# Patient Record
Sex: FEMALE | URBAN - METROPOLITAN AREA
[De-identification: names, ages, dates, MRNs, and addresses within clinical notes are randomized per-mention and may not be internally consistent; named-entity substitution may affect disease eponyms.]

---

## 2023-07-13 ENCOUNTER — LAB (OUTPATIENT)
Dept: LAB | Facility: CLINIC | Age: 33
End: 2023-07-13
Attending: NURSE PRACTITIONER
Payer: COMMERCIAL

## 2023-07-13 ENCOUNTER — OFFICE VISIT (OUTPATIENT)
Dept: OBGYN | Facility: CLINIC | Age: 33
End: 2023-07-13
Attending: NURSE PRACTITIONER
Payer: COMMERCIAL

## 2023-07-13 VITALS
BODY MASS INDEX: 22.51 KG/M2 | HEART RATE: 81 BPM | HEIGHT: 69 IN | DIASTOLIC BLOOD PRESSURE: 76 MMHG | WEIGHT: 152 LBS | SYSTOLIC BLOOD PRESSURE: 116 MMHG

## 2023-07-13 DIAGNOSIS — Z13.220 SCREENING FOR LIPID DISORDERS: ICD-10-CM

## 2023-07-13 DIAGNOSIS — B37.31 RECURRENT CANDIDIASIS OF VAGINA: ICD-10-CM

## 2023-07-13 DIAGNOSIS — Z30.432 ENCOUNTER FOR REMOVAL OF INTRAUTERINE CONTRACEPTIVE DEVICE: ICD-10-CM

## 2023-07-13 DIAGNOSIS — Z00.00 VISIT FOR PREVENTIVE HEALTH EXAMINATION: ICD-10-CM

## 2023-07-13 DIAGNOSIS — Z00.00 VISIT FOR PREVENTIVE HEALTH EXAMINATION: Primary | ICD-10-CM

## 2023-07-13 DIAGNOSIS — B00.9 HSV (HERPES SIMPLEX VIRUS) INFECTION: ICD-10-CM

## 2023-07-13 DIAGNOSIS — Z86.2 HISTORY OF ANEMIA: ICD-10-CM

## 2023-07-13 LAB
CHOLEST SERPL-MCNC: 151 MG/DL
CREAT SERPL-MCNC: 0.7 MG/DL (ref 0.51–0.95)
ERYTHROCYTE [DISTWIDTH] IN BLOOD BY AUTOMATED COUNT: 12.3 % (ref 10–15)
GFR SERPL CREATININE-BSD FRML MDRD: >90 ML/MIN/1.73M2
HCT VFR BLD AUTO: 42 % (ref 35–47)
HDLC SERPL-MCNC: 62 MG/DL
HGB BLD-MCNC: 14.7 G/DL (ref 11.7–15.7)
LDLC SERPL CALC-MCNC: 76 MG/DL
MCH RBC QN AUTO: 29.9 PG (ref 26.5–33)
MCHC RBC AUTO-ENTMCNC: 35 G/DL (ref 31.5–36.5)
MCV RBC AUTO: 86 FL (ref 78–100)
NONHDLC SERPL-MCNC: 89 MG/DL
PLATELET # BLD AUTO: 258 10E3/UL (ref 150–450)
RBC # BLD AUTO: 4.91 10E6/UL (ref 3.8–5.2)
TRIGL SERPL-MCNC: 67 MG/DL
WBC # BLD AUTO: 9.4 10E3/UL (ref 4–11)

## 2023-07-13 PROCEDURE — 82565 ASSAY OF CREATININE: CPT

## 2023-07-13 PROCEDURE — 99213 OFFICE O/P EST LOW 20 MIN: CPT | Performed by: NURSE PRACTITIONER

## 2023-07-13 PROCEDURE — 36415 COLL VENOUS BLD VENIPUNCTURE: CPT

## 2023-07-13 PROCEDURE — 58301 REMOVE INTRAUTERINE DEVICE: CPT | Performed by: NURSE PRACTITIONER

## 2023-07-13 PROCEDURE — 99385 PREV VISIT NEW AGE 18-39: CPT | Mod: 25 | Performed by: NURSE PRACTITIONER

## 2023-07-13 PROCEDURE — 85027 COMPLETE CBC AUTOMATED: CPT

## 2023-07-13 PROCEDURE — 80061 LIPID PANEL: CPT

## 2023-07-13 RX ORDER — PRENATAL VIT/IRON FUM/FOLIC AC 27MG-0.8MG
1 TABLET ORAL DAILY
COMMUNITY

## 2023-07-13 RX ORDER — ACYCLOVIR 400 MG/1
1 TABLET ORAL 2 TIMES DAILY
COMMUNITY
Start: 2023-01-10 | End: 2023-07-13

## 2023-07-13 RX ORDER — ACYCLOVIR 400 MG/1
400 TABLET ORAL 2 TIMES DAILY
Qty: 90 TABLET | Refills: 6 | Status: SHIPPED | OUTPATIENT
Start: 2023-07-13

## 2023-07-13 RX ORDER — LACTOBACILLUS RHAMNOSUS GG 10B CELL
1 CAPSULE ORAL 2 TIMES DAILY
COMMUNITY

## 2023-07-13 ASSESSMENT — ANXIETY QUESTIONNAIRES
5. BEING SO RESTLESS THAT IT IS HARD TO SIT STILL: NOT AT ALL
6. BECOMING EASILY ANNOYED OR IRRITABLE: NOT AT ALL
3. WORRYING TOO MUCH ABOUT DIFFERENT THINGS: NOT AT ALL
1. FEELING NERVOUS, ANXIOUS, OR ON EDGE: NOT AT ALL
IF YOU CHECKED OFF ANY PROBLEMS ON THIS QUESTIONNAIRE, HOW DIFFICULT HAVE THESE PROBLEMS MADE IT FOR YOU TO DO YOUR WORK, TAKE CARE OF THINGS AT HOME, OR GET ALONG WITH OTHER PEOPLE: NOT DIFFICULT AT ALL
GAD7 TOTAL SCORE: 2
2. NOT BEING ABLE TO STOP OR CONTROL WORRYING: NOT AT ALL
GAD7 TOTAL SCORE: 2
7. FEELING AFRAID AS IF SOMETHING AWFUL MIGHT HAPPEN: NOT AT ALL

## 2023-07-13 ASSESSMENT — PATIENT HEALTH QUESTIONNAIRE - PHQ9
5. POOR APPETITE OR OVEREATING: MORE THAN HALF THE DAYS
SUM OF ALL RESPONSES TO PHQ QUESTIONS 1-9: 2

## 2023-07-13 ASSESSMENT — PAIN SCALES - GENERAL: PAINLEVEL: NO PAIN (0)

## 2023-07-13 NOTE — NURSING NOTE
Chief Complaint   Patient presents with     Establish Care     Annual , pre-pregnancy consult   Joycelyn Jj LPN

## 2023-07-13 NOTE — PROGRESS NOTES
"  Progress Note    SUBJECTIVE:  River Dillon is an 32 year old, , who requests an Annual Preventive Exam.     Concerns today include:     1. IUD removal: Paragard IUD in place, placed 8 weeks postpartum.  Plans to use condoms until ready to conceive with 2nd child. Wondering if the IUD is contributing to frequent yeast infections.    2. Recurrent yeast infections: has had nearly monthly yeast infections since her son was born 2021.  Doing boric monthly which seems to help.  When she has taken Fluconazole, she would have a herpes outbreak. She feels the HSV is related to the yeast. Had intermittent yeast infections, about once every 5 yrs, since high school.  Has also tried Monistat OTC.  Has never had a yeast culture. No symptoms today.      3. HSV: first outbreak was in  when living in [facility].  Was not taking any suppression medication until this year. In the interim had 1-2 outbreaks, which resolved within 1 week. Taking Acyclovir 400mg daily.  Had SE with valcyclovir.      4. Hx of traumatic birth experience:   - Alvin, born 2023 Vaginal birth, 40w3d - gave birth in triage due to no rooms available.  2nd degree laceration \"both ways\". Uterine or vaginal prolapse. Had a hard time walking normal until October. Tried to get in with Pelvic floor PT, but was only able to do a virtual appointment. Still has occasional stress incontinence - sneezing, exercise.  Then moved to the [facility].  Not interested in restarting PT at this time. Feels she is able to control her pain well when she feels she is in control and delivering in triage felt like she was not able to do this. Desiring a 2nd pregnancy.     Periods are regular; have always been heavy.  No significant dysmenorrhea.     Last pap smear: 2020 NIL, HPV negative  - hx of abnormal pap smear in  - pos with HPV    Lipid profile: never drawn    Social hx: Pursuing a doctorate in [employer] History. Will finish this . . " Has 2 yr old son    Reports immunizations are up to date.     Mental health: no concerns today     Exercise: active lifestyle; has a 2 yr old  Diet: no red meat; tends to eat healthily, tries to limit sugar    Menstrual History:      2023     8:30 AM   Menstrual History   LAST MENSTRUAL PERIOD 6/15/2023     Mammogram current: n/a    Last Colonoscopy: n/a    HISTORY:  lactobacillus rhamnosus, GG, (CULTURELL) capsule, Take 1 capsule by mouth 2 times daily  PARAGARD INTRAUTERINE COPPER IU,   Prenatal Vit-Fe Fumarate-FA (PRENATAL MULTIVITAMIN W/IRON) 27-0.8 MG tablet, Take 1 tablet by mouth daily  Probiotic Product (PROBIOTIC ADVANCED PO),     No current facility-administered medications on file prior to visit.    No Known Allergies    There is no immunization history on file for this patient.    OB History    Para Term  AB Living   1 1 1 0 0 1   SAB IAB Ectopic Multiple Live Births   0 0 0 0 1     Past Medical History:   Diagnosis Date     Known health problems: none      Past Surgical History:   Procedure Laterality Date     NO HISTORY OF SURGERY       Family History   Problem Relation Age of Onset     Hypertension Mother      Thyroid Disease Mother      Polycystic ovary syndrome Mother      Skin Cancer Father      Breast Cancer Maternal Grandmother      Breast Cancer Paternal Grandmother      Melanoma Sister      Colon Cancer No family hx of      Social History     Socioeconomic History     Marital status:      Spouse name: None     Number of children: None     Years of education: None     Highest education level: None   Tobacco Use     Smoking status: Never     Passive exposure: Never     Smokeless tobacco: Never   Vaping Use     Vaping Use: Never used   Substance and Sexual Activity     Alcohol use: Yes     Comment: less than once per week     Drug use: Never     Sexual activity: Yes     Partners: Male     Birth control/protection: I.U.D.       ROS  ROS: 10 point ROS neg other than the  "symptoms noted above in the HPI.        7/13/2023     8:30 AM   PHQ-9 SCORE   PHQ-9 Total Score 2         7/13/2023     8:30 AM   KATHARINA-7 SCORE   Total Score 2     EXAM:  Blood pressure 116/76, pulse 81, height 1.746 m (5' 8.75\"), weight 68.9 kg (152 lb), last menstrual period 06/15/2023. Body mass index is 22.61 kg/m .  General - pleasant female in no acute distress.  Skin - no suspicious lesions or rashes  EENT-  euthyroid with out palpable nodules  Neck - supple without lymphadenopathy.  Lungs - clear to auscultation bilaterally.  Heart - regular rate and rhythm without murmur.  Abdomen - soft, nontender, nondistended, no masses or organomegaly noted.  Musculoskeletal - no gross deformities.  Neurological - normal strength, sensation, and mental status.    Breast Exam:  Breast: Without visible skin changes. No dimpling or lesions seen.   Breasts supple, non-tender with palpation, no dominant mass, nodularity, or nipple discharge noted bilaterally. Axillary nodes negative.      Pelvic Exam:  EG/BUS: Normal genital architecture without lesions, erythema or abnormal secretions; Bartholin's, Urethra, [place]'s normal   Urethral meatus: normal   Urethra: no masses, tenderness, or scarring  Vagina: moist, pink, rugae with creamy, white and odorless secretions  Cervix: pink, moist, closed, without lesion or CMT and IUD strings extend 0.5 cm from external os.  Rectum: anus normal     ASSESSMENT & PLAN:     1. Encounter for removal of intrauterine contraceptive device  IUD removed successfully today without complication - see procedure note below.   Patient will continue prenatal vitamin and use condoms until desiring to conceive.     2. Visit for preventive health examination  - Lipid Profile; Future  - CBC with Platelets; Future  - Pap smear due 2025    3. Screening for lipid disorders  - Lipid Profile; Future    4. History of anemia  - CBC with Platelets; Future    6. HSV (herpes simplex virus) infection  - Suppression " therapy: acyclovir (ZOVIRAX) 400 MG tablet; Take 1 tablet (400 mg) by mouth 2 times daily  Dispense: 90 tablet; Refill: 6  - Creatinine; Future    7. Recurrent candidiasis of vagina  - No current symptoms. IUD removed today in hopes that this will decrease rate of infections. Pt to return to clinic if symptoms return for yeast culture.      8. Birth trauma  - Offered counseling related to traumatic birth / postpartum experience. Pt declined today. Counseled on the supportive practices of this provider group related to physiologic birth.       Additional teaching done at this visit regarding calcium (1200 mg per day), self breast awareness, exercise, preconception, mental health and weight/diet.    Return to clinic in one year.  Follow-up as needed.    IUD Removal:  SUBJECTIVE:    Is a pregnancy test required: No.  Was a consent obtained?  Yes    PROCEDURE:    A speculum exam was performed and the cervix was visualized. The IUD string was visualized. Using ring forceps, the string  was grasped and the IUD removed intact.    POST PROCEDURE:    The patient tolerated the procedure well. Patient was discharged in stable condition.    BRYON Hatfield CNP

## 2023-07-13 NOTE — LETTER
"2023       RE: River Dillon  [address] [place] [address]     Dear Colleague,    Thank you for referring your patient, River Dillon, to the [facility] [place] [facility]. Please see a copy of my visit note below.      Progress Note    SUBJECTIVE:  River Dillon is an 32 year old, , who requests an Annual Preventive Exam.     Concerns today include:     1. IUD removal: Paragard IUD in place, placed 8 weeks postpartum.  Plans to use condoms until ready to conceive with 2nd child. Wondering if the IUD is contributing to frequent yeast infections.    2. Recurrent yeast infections: has had nearly monthly yeast infections since her son was born 2021.  Doing boric monthly which seems to help.  When she has taken Fluconazole, she would have a herpes outbreak. She feels the HSV is related to the yeast. Had intermittent yeast infections, about once every 5 yrs, since high school.  Has also tried Monistat OTC.  Has never had a yeast culture. No symptoms today.      3. HSV: first outbreak was in  when living in [facility].  Was not taking any suppression medication until this year. In the interim had 1-2 outbreaks, which resolved within 1 week. Taking Acyclovir 400mg daily.  Had SE with valcyclovir.      4. Hx of traumatic birth experience:   - Alvin, born 2021 Vaginal birth, 40w3d - gave birth in triage due to no rooms available.  2nd degree laceration \"both ways\". Uterine or vaginal prolapse. Had a hard time walking normal until October. Tried to get in with Pelvic floor PT, but was only able to do a virtual appointment. Still has occasional stress incontinence - sneezing, exercise.  Then moved to the [facility].  Not interested in restarting PT at this time. Feels she is able to control her pain well when she feels she is in control and delivering in triage felt like she was not able to do " this. Desiring a 2nd pregnancy.     Periods are regular; have always been heavy.  No significant dysmenorrhea.     Last pap smear: 2020 NIL, HPV negative  - hx of abnormal pap smear in  - pos with HPV    Lipid profile: never drawn    Social hx: Pursuing a doctorate in [employer] History. Will finish this . . Has 2 yr old son    Reports immunizations are up to date.     Mental health: no concerns today     Exercise: active lifestyle; has a 2 yr old  Diet: no red meat; tends to eat healthily, tries to limit sugar    Menstrual History:      2023     8:30 AM   Menstrual History   LAST MENSTRUAL PERIOD 6/15/2023     Mammogram current: n/a    Last Colonoscopy: n/a    HISTORY:  lactobacillus rhamnosus, GG, (CULTURELL) capsule, Take 1 capsule by mouth 2 times daily  PARAGARD INTRAUTERINE COPPER IU,   Prenatal Vit-Fe Fumarate-FA (PRENATAL MULTIVITAMIN W/IRON) 27-0.8 MG tablet, Take 1 tablet by mouth daily  Probiotic Product (PROBIOTIC ADVANCED PO),     No current facility-administered medications on file prior to visit.    No Known Allergies    There is no immunization history on file for this patient.    OB History    Para Term  AB Living   1 1 1 0 0 1   SAB IAB Ectopic Multiple Live Births   0 0 0 0 1     Past Medical History:   Diagnosis Date    Known health problems: none      Past Surgical History:   Procedure Laterality Date    NO HISTORY OF SURGERY       Family History   Problem Relation Age of Onset    Hypertension Mother     Thyroid Disease Mother     Polycystic ovary syndrome Mother     Skin Cancer Father     Breast Cancer Maternal Grandmother     Breast Cancer Paternal Grandmother     Melanoma Sister     Colon Cancer No family hx of      Social History     Socioeconomic History    Marital status:      Spouse name: None    Number of children: None    Years of education: None    Highest education level: None   Tobacco Use    Smoking status: Never     Passive exposure: Never     "Smokeless tobacco: Never   Vaping Use    Vaping Use: Never used   Substance and Sexual Activity    Alcohol use: Yes     Comment: less than once per week    Drug use: Never    Sexual activity: Yes     Partners: Male     Birth control/protection: I.U.D.       ROS  ROS: 10 point ROS neg other than the symptoms noted above in the HPI.        7/13/2023     8:30 AM   PHQ-9 SCORE   PHQ-9 Total Score 2         7/13/2023     8:30 AM   KATHARINA-7 SCORE   Total Score 2     EXAM:  Blood pressure 116/76, pulse 81, height 1.746 m (5' 8.75\"), weight 68.9 kg (152 lb), last menstrual period 06/15/2023. Body mass index is 22.61 kg/m .  General - pleasant female in no acute distress.  Skin - no suspicious lesions or rashes  EENT-  euthyroid with out palpable nodules  Neck - supple without lymphadenopathy.  Lungs - clear to auscultation bilaterally.  Heart - regular rate and rhythm without murmur.  Abdomen - soft, nontender, nondistended, no masses or organomegaly noted.  Musculoskeletal - no gross deformities.  Neurological - normal strength, sensation, and mental status.    Breast Exam:  Breast: Without visible skin changes. No dimpling or lesions seen.   Breasts supple, non-tender with palpation, no dominant mass, nodularity, or nipple discharge noted bilaterally. Axillary nodes negative.      Pelvic Exam:  EG/BUS: Normal genital architecture without lesions, erythema or abnormal secretions; Bartholin's, Urethra, [place]'s normal   Urethral meatus: normal   Urethra: no masses, tenderness, or scarring  Vagina: moist, pink, rugae with creamy, white and odorless secretions  Cervix: pink, moist, closed, without lesion or CMT and IUD strings extend 0.5 cm from external os.  Rectum: anus normal     ASSESSMENT & PLAN:     1. Encounter for removal of intrauterine contraceptive device  IUD removed successfully today without complication - see procedure note below.   Patient will continue prenatal vitamin and use condoms until desiring to conceive. "     2. Visit for preventive health examination  - Lipid Profile; Future  - CBC with Platelets; Future  - Pap smear due 2025    3. Screening for lipid disorders  - Lipid Profile; Future    4. History of anemia  - CBC with Platelets; Future    6. HSV (herpes simplex virus) infection  - Suppression therapy: acyclovir (ZOVIRAX) 400 MG tablet; Take 1 tablet (400 mg) by mouth 2 times daily  Dispense: 90 tablet; Refill: 6  - Creatinine; Future    7. Recurrent candidiasis of vagina  - No current symptoms. IUD removed today in hopes that this will decrease rate of infections. Pt to return to clinic if symptoms return for yeast culture.      8. Birth trauma  - Offered counseling related to traumatic birth / postpartum experience. Pt declined today. Counseled on the supportive practices of this provider group related to physiologic birth.       Additional teaching done at this visit regarding calcium (1200 mg per day), self breast awareness, exercise, preconception, mental health and weight/diet.    Return to clinic in one year.  Follow-up as needed.    IUD Removal:  SUBJECTIVE:    Is a pregnancy test required: No.  Was a consent obtained?  Yes    PROCEDURE:    A speculum exam was performed and the cervix was visualized. The IUD string was visualized. Using ring forceps, the string  was grasped and the IUD removed intact.    POST PROCEDURE:    The patient tolerated the procedure well. Patient was discharged in stable condition.    BRYON Hatfield CNP

## 2023-07-13 NOTE — PATIENT INSTRUCTIONS
Thank you for trusting us with your care!     If you need to contact us for questions about:  Symptoms, Scheduling & Medical Questions; Non-urgent (2-3 day response) Eleanor message, Urgent (needing response today) [phone] (if after 3:30pm next day response)   Prescriptions: Please call your Pharmacy   Billing: [facility] [phone] or [facility]:[phone]

## 2023-08-30 PROBLEM — A60.00 GENITAL HERPES SIMPLEX: Status: ACTIVE | Noted: 2021-05-27

## 2023-08-30 PROBLEM — T83.32XA IUD THREADS LOST: Status: ACTIVE | Noted: 2023-02-02

## 2023-08-30 PROBLEM — N39.3 FEMALE STRESS INCONTINENCE: Status: ACTIVE | Noted: 2022-10-04

## 2023-08-30 PROBLEM — N89.8 VAGINAL IRRITATION: Status: ACTIVE | Noted: 2023-08-30

## 2023-08-30 PROBLEM — M62.89 PELVIC FLOOR DYSFUNCTION: Status: ACTIVE | Noted: 2022-10-04

## 2023-08-30 NOTE — PROGRESS NOTES
SUBJECTIVE:  River Dillon is an 32 year old  Female, , who presents with complaints of vaginal itching and chronic vaginal infections     She is a former patient to the [facility].   LMP 23, 4 days, cycles are ~28-30 days  Currently sexually active with one partner. They are monogamous, declines STD screening today (has recently had a negative GC/CT screen)    Not currently using any method of birth control, River would like to conceive their second child, but would like to get her recurrent vaginal infection treated and under control first    River treated a yeast infection with monistat for one year. She got into a cycle of having a yeast infection, herpes, menstrual period cycle last summer  Feels like she has had on going infections on and off since giving birth two years ago.  River started Acyclovir 400mg twice a day for suppression and this has been effective in preventing HSV outbreaks    Developed vaginal itching and vulvar irritation last week, today the symptoms are a 7/10    Gets tingling and low back pain before she has these symptoms. She denies any herpes outbreaks with the symptoms but has noticed some separation of her skin, cracking.     Takes boric acid one suppository for 3-4 days. Tends to get itchy 3-4 days before her cycle. This has been the most effective in managing her symptoms. Monistat has not been as effective and she feels like Diflucan causes her to have a herpes outbreak    Reports she uses cotton only underwear, avoids wearing underwear at night and avoids fragrances to any products she is using.   River does have documented positive candida species yeast infections on:    23 Candida species  22: Candida species    Recent annual exam with Batsheva Zulauga, NP: 23: IUD removed    Last pap 2020 HPV neg  2023 Lipid screen WNL  23 negative vaginitis panel  23: Candida yeast infection  2023 HgbA1C  4.9      Menstrual History:      7/13/2023     8:30 AM 8/31/2023     8:40 AM   Menstrual History   LAST MENSTRUAL PERIOD 6/15/2023 7/18/2023       Past Medical History:   Diagnosis Date    Known health problems: none         OBJECTIVE:   /74   Pulse 75   Wt 67.6 kg (149 lb)   LMP 07/18/2023   BMI 22.16 kg/m       She appears well, afebrile.  Abdomen: benign, soft, nontender, no masses.  CVA tenderness to percussion.    Pelvic Exam:  EG/BUS: Vulva are erythematous and edematous, cracking of skin, causing separation and ulceration on the outer aspect of her left labia majora.     Vagina: moist, pink, rugae with creamy, white, and odorlesssecretions  Cervix: Multiparous, smooth pink  Uterus:anteverted,    Adnexa:Within normal limits and No masses, nodularity, tenderness  Rectum:anus normal    Collected: wet prep both from external genitalia and inside vagina, vaginitis panel, yeast panel and HSV PCR    POCT Wet prep: Ph: 4.0  External genitalia: + yeast, - BV, -trich (more yeast with vulvar sampling  Vaginal sampling: + yeast, - BV, - Tich        ASSESSMENT:   Encounter Diagnoses   Name Primary?    Vaginal irritation Yes    Yeast infection of the vagina       Positive yeast    PLAN:   Orders Placed This Encounter   Procedures    Wet Prep POCT     Orders Placed This Encounter   Medications    terconazole (TERAZOL 7) 0.4 % vaginal cream     Sig: Place 1 applicator vaginally At Bedtime     Dispense:  45 g     Refill:  1    fluconazole (DIFLUCAN) 150 MG tablet     Sig: Take 1 tablet (150 mg) by mouth daily for 33 doses     Dispense:  33 tablet     Refill:  0     Take one tablet daily for three days, then weekly for 6 months      -Reviewed recommendation that River take Diflucan for three days then weekly for 6 months to treat recurrent vaginal yeast. River would like to try Terconazole right now and consider if she would like to try Diflucan as well.   -Reassured that the Acyclovir 800mg daily should suppress a  herpes outbreak  -Reviewed recommendation not to take Diflucan during pregnancy, could discontinue when she starts trying or avoid taking it from between ovulation until she gets her period. (Or at least from implantation until her menstrual cycle.   -Follow up lab results.     -Reviewed vulvar hygiene recommendations: Wear breathable cotton underwear, bathe and change into new clothes after working out. Avoid fragrant body washes, laundry detergent, body products and bubble bath. Avoid douching or inserting products vaginally.   -Try to increase the healthy bacteria by taking probiotics. Some options are Femdophilis or Florajen from a co-op. Probiotics with bifudus, acidophilis and other healthy bacteria (some studies recommend L rhamnosus GR-1, L reuteri, in addition to L. Acidophilis).       Return to clinic prn if symptoms persist or worsen.    Time spent:  Chart review/Pre-chartin min on day of service  Face-to-face visit: 40   Documentation: 10  Total time spent on the day of service: 55 minutes    BRYON Hernández, LUIS MANUEL

## 2023-08-31 ENCOUNTER — OFFICE VISIT (OUTPATIENT)
Dept: OBGYN | Facility: CLINIC | Age: 33
End: 2023-08-31
Attending: ADVANCED PRACTICE MIDWIFE
Payer: COMMERCIAL

## 2023-08-31 VITALS
SYSTOLIC BLOOD PRESSURE: 113 MMHG | WEIGHT: 149 LBS | BODY MASS INDEX: 22.16 KG/M2 | DIASTOLIC BLOOD PRESSURE: 74 MMHG | HEART RATE: 75 BPM

## 2023-08-31 DIAGNOSIS — N89.8 VAGINAL IRRITATION: ICD-10-CM

## 2023-08-31 DIAGNOSIS — B37.31 YEAST INFECTION OF THE VAGINA: Primary | ICD-10-CM

## 2023-08-31 LAB
BACTERIAL VAGINOSIS VAG-IMP: NEGATIVE
CANDIDA DNA VAG QL NAA+PROBE: DETECTED
CANDIDA GLABRATA / CANDIDA KRUSEI DNA: NOT DETECTED
CLUE CELLS: NEGATIVE
T VAGINALIS DNA VAG QL NAA+PROBE: NOT DETECTED
TRICHOMONAS (WET PREP): NEGATIVE
WBC (WET PREP): ABNORMAL
YEAST (WET PREP): POSITIVE

## 2023-08-31 PROCEDURE — 87106 FUNGI IDENTIFICATION YEAST: CPT | Performed by: ADVANCED PRACTICE MIDWIFE

## 2023-08-31 PROCEDURE — 99215 OFFICE O/P EST HI 40 MIN: CPT | Performed by: ADVANCED PRACTICE MIDWIFE

## 2023-08-31 PROCEDURE — 0352U MULTIPLEX VAGINAL PANEL BY PCR: CPT | Performed by: ADVANCED PRACTICE MIDWIFE

## 2023-08-31 PROCEDURE — 99213 OFFICE O/P EST LOW 20 MIN: CPT | Performed by: ADVANCED PRACTICE MIDWIFE

## 2023-08-31 PROCEDURE — 87210 SMEAR WET MOUNT SALINE/INK: CPT | Performed by: ADVANCED PRACTICE MIDWIFE

## 2023-08-31 PROCEDURE — 87529 HSV DNA AMP PROBE: CPT | Mod: 59 | Performed by: ADVANCED PRACTICE MIDWIFE

## 2023-08-31 RX ORDER — FLUCONAZOLE 150 MG/1
150 TABLET ORAL DAILY
Qty: 33 TABLET | Refills: 0 | Status: SHIPPED | OUTPATIENT
Start: 2023-08-31 | End: 2023-10-03

## 2023-08-31 RX ORDER — TERCONAZOLE 0.4 %
1 CREAM WITH APPLICATOR VAGINAL AT BEDTIME
Qty: 45 G | Refills: 1 | Status: SHIPPED | OUTPATIENT
Start: 2023-08-31

## 2023-08-31 ASSESSMENT — PAIN SCALES - GENERAL: PAINLEVEL: NO PAIN (0)

## 2023-08-31 NOTE — PATIENT INSTRUCTIONS
Try to increase the healthy bacteria by taking probiotics. Some options are Femdophilis or Florajen from a co-op. Probiotics with bifudus, acidophilis and other healthy bacteria (some studies recommend L rhamnosus GR-1, L reuteri, in addition to L. Acidophilis).     Boric acid vaginal suppositories once a night for 7 nights can be used to treat a vaginal infection. Some people do one boric acid suppository after intercourse to prevent infections as well. You should be able to get those on-line, or you can get boric acid powder with size 00 gel caps and make your own. Make sure not to take any of this by mouth.

## 2023-08-31 NOTE — LETTER
2023       RE: River Dillon  [address] [place] [address]     Dear Colleague,    Thank you for referring your patient, River Dillon, to the [facility] [place] at [facility]. Please see a copy of my visit note below.    SUBJECTIVE:  River Dillon is an 32 year old  Female, , who presents with complaints of vaginal itching and chronic vaginal infections     She is a former patient to the [facility].   LMP 23, 4 days, cycles are ~28-30 days  Currently sexually active with one partner. They are monogamous, declines STD screening today (has recently had a negative GC/CT screen)    Not currently using any method of birth control, River would like to conceive their second child, but would like to get her recurrent vaginal infection treated and under control first    River treated a yeast infection with monistat for one year. She got into a cycle of having a yeast infection, herpes, menstrual period cycle last summer  Feels like she has had on going infections on and off since giving birth two years ago.  River started Acyclovir 400mg twice a day for suppression and this has been effective in preventing HSV outbreaks    Developed vaginal itching and vulvar irritation last week, today the symptoms are a 7/10    Gets tingling and low back pain before she has these symptoms. She denies any herpes outbreaks with the symptoms but has noticed some separation of her skin, cracking.     Takes boric acid one suppository for 3-4 days. Tends to get itchy 3-4 days before her cycle. This has been the most effective in managing her symptoms. Monistat has not been as effective and she feels like Diflucan causes her to have a herpes outbreak    Reports she uses cotton only underwear, avoids wearing underwear at night and avoids fragrances to any products she is using.   River does have  documented positive candida species yeast infections on:    2/2/23 Candida species  9/30/22: Candida species    Recent annual exam with Batsheva Zuluaga, NP: 7/13/23: IUD removed    Last pap 8/2020 HPV neg  7/2023 Lipid screen WNL  2/6/23 negative vaginitis panel  2/2/23: Candida yeast infection  2/2023 HgbA1C 4.9      Menstrual History:      7/13/2023     8:30 AM 8/31/2023     8:40 AM   Menstrual History   LAST MENSTRUAL PERIOD 6/15/2023 7/18/2023       Past Medical History:   Diagnosis Date    Known health problems: none         OBJECTIVE:   /74   Pulse 75   Wt 67.6 kg (149 lb)   LMP 07/18/2023   BMI 22.16 kg/m       She appears well, afebrile.  Abdomen: benign, soft, nontender, no masses.  CVA tenderness to percussion.    Pelvic Exam:  EG/BUS: Vulva are erythematous and edematous, cracking of skin, causing separation and ulceration on the outer aspect of her left labia majora.     Vagina: moist, pink, rugae with creamy, white, and odorlesssecretions  Cervix: Multiparous, smooth pink  Uterus:anteverted,    Adnexa:Within normal limits and No masses, nodularity, tenderness  Rectum:anus normal    Collected: wet prep both from external genitalia and inside vagina, vaginitis panel, yeast panel and HSV PCR    POCT Wet prep: Ph: 4.0  External genitalia: + yeast, - BV, -trich (more yeast with vulvar sampling  Vaginal sampling: + yeast, - BV, - Tich        ASSESSMENT:   Encounter Diagnoses   Name Primary?    Vaginal irritation Yes    Yeast infection of the vagina       Positive yeast    PLAN:   Orders Placed This Encounter   Procedures    Wet Prep POCT     Orders Placed This Encounter   Medications    terconazole (TERAZOL 7) 0.4 % vaginal cream     Sig: Place 1 applicator vaginally At Bedtime     Dispense:  45 g     Refill:  1    fluconazole (DIFLUCAN) 150 MG tablet     Sig: Take 1 tablet (150 mg) by mouth daily for 33 doses     Dispense:  33 tablet     Refill:  0     Take one tablet daily for three days, then  weekly for 6 months      -Reviewed recommendation that River take Diflucan for three days then weekly for 6 months to treat recurrent vaginal yeast. River would like to try Terconazole right now and consider if she would like to try Diflucan as well.   -Reassured that the Acyclovir 800mg daily should suppress a herpes outbreak  -Reviewed recommendation not to take Diflucan during pregnancy, could discontinue when she starts trying or avoid taking it from between ovulation until she gets her period. (Or at least from implantation until her menstrual cycle.   -Follow up lab results.     -Reviewed vulvar hygiene recommendations: Wear breathable cotton underwear, bathe and change into new clothes after working out. Avoid fragrant body washes, laundry detergent, body products and bubble bath. Avoid douching or inserting products vaginally.   -Try to increase the healthy bacteria by taking probiotics. Some options are Femdophilis or Florajen from a co-op. Probiotics with bifudus, acidophilis and other healthy bacteria (some studies recommend L rhamnosus GR-1, L reuteri, in addition to L. Acidophilis).     Return to clinic prn if symptoms persist or worsen.    Time spent:  Chart review/Pre-chartin min on day of service  Face-to-face visit: 40   Documentation: 10  Total time spent on the day of service: 55 minutes    BRYON Hernández, LUIS MANUEL

## 2023-09-01 LAB
HSV1 DNA SPEC QL NAA+PROBE: NOT DETECTED
HSV2 DNA SPEC QL NAA+PROBE: NOT DETECTED

## 2023-09-04 LAB — BACTERIA SPEC CULT: ABNORMAL

## 2023-10-09 ENCOUNTER — VIRTUAL VISIT (OUTPATIENT)
Dept: OBGYN | Facility: CLINIC | Age: 33
End: 2023-10-09
Attending: REGISTERED NURSE
Payer: COMMERCIAL

## 2023-10-09 DIAGNOSIS — Z34.90 SUPERVISION OF NORMAL PREGNANCY: Primary | ICD-10-CM

## 2023-10-09 PROBLEM — T83.32XA IUD THREADS LOST: Status: RESOLVED | Noted: 2023-02-02 | Resolved: 2023-10-09

## 2023-10-09 PROCEDURE — 99207 PR NO BILLABLE SERVICE THIS VISIT: CPT

## 2023-10-09 RX ORDER — OMEGA-3 FATTY ACIDS/FISH OIL 300-1000MG
1 CAPSULE ORAL DAILY
COMMUNITY

## 2023-10-09 NOTE — LETTER
10/9/2023       RE: River Dillon  [address] [place] [address]     Dear Colleague,    Thank you for referring your patient, River Dillon, to the [facility] [place] at [facility]. Please see a copy of my visit note below.    KATHERINE RN Prenatal Intake note  Subjective     32 year old female newly pregnant.  LMP: 23.    exact and regular cycles  Pregnancy is unplanned but welcomed.    Partner/support person name and relationship  Alex (legal name is Franklin).        Symptoms since LMP include fatigue and constantly queasy. Patient has tried these relief   measures: small frequent meals and increased rest.    OB HISTORY  OB History    Para Term  AB Living   2 1 1 0 0 1   SAB IAB Ectopic Multiple Live Births   0 0 0 0 1      # Outcome Date GA Lbr Jackson/2nd Weight Sex Delivery Anes PTL Lv   2 Current            1 Term 21 40w3d  3.969 kg (8 lb 12 oz) M Vag-Spont Local N SURAJ      Name: ALEKSANDR,MALE      Apgar1: 6  Apgar5: 9      Obstetric Comments   NONE-first child born during pandemic.  River reports this was traumatic experience for her            OB COMPLICATIONS  First Pregnancy No  GDM No  HTNNo  Preeclampsia No   labor No   birth No   birth No  PP hemorrhage No  Retained placenta No  PP mood disorder No  Fetal anomaly No  FGR No  Macrosomia  No  3rd or 4th degree laceration  No  Shoulder dystocia No  NICU admit No       PERSONAL/SOCIAL HISTORY  *updated all tabs in history    lives with their family.  Employment: Unemployed .  Her partner works as a public interest .   MENTAL HEALTH HISTORY:        - Current Medications    Current Outpatient Medications   Medication Sig Dispense Refill    omega 3 1000 MG CAPS Take 1 capful. by mouth daily      acyclovir (ZOVIRAX) 400 MG tablet Take 1 tablet (400 mg) by mouth 2 times daily 90 tablet 6    lactobacillus  rhamnosus, GG, (CULTURELL) capsule Take 1 capsule by mouth 2 times daily      Prenatal Vit-Fe Fumarate-FA (PRENATAL MULTIVITAMIN W/IRON) 27-0.8 MG tablet Take 1 tablet by mouth daily      terconazole (TERAZOL 7) 0.4 % vaginal cream Place 1 applicator vaginally At Bedtime 45 g 1           - Co-morbids    Past Medical History:   Diagnosis Date    Herpes simplex virus (HSV) infection 2012    Known health problems: none     Pelvic floor dysfunction     Uncomplicated asthma 2003    Urinary incontinence 2021       - Genetic/Infection questionnaire completed, risks include possible ASD in FOBs father. Discussed genetic screening options, patient does desire first trimester genetic screening  Pt  does not have a recent known exposure to Parvo or CMV so IgG/IgM testing WILL NOT be ordered.   Flu Vaccine.  Patient declined, will consider next visit  COVID Vaccine had initial doses.  She has not had the 2023 booster.  She thinks that she had covid very early in outbreak.  She was interviewing Chinese/americ[place] in [place] before it was identified  - Discussed expectations for routine prenatal care and scheduling.  -Discussed highlights from The Expectant Family booklet on warning signs, safe pregnancy and prevention of infections diseases, nutrition and exercise.  - Patient was encouraged to start prenatal vitamins as tolerated, if experiencing nausea and vomiting then OK to switch to folic acid only at this time.    -Additional items: None  -Reconciled and reviewed problem list  -Pt scheduled for dating US and NOB on 10/13/23    Charity Espinal RN

## 2023-10-09 NOTE — PROGRESS NOTES
KATHERINE RN Prenatal Intake note  Subjective     32 year old female newly pregnant.  LMP: 23.    exact and regular cycles  Pregnancy is unplanned but welcomed.    Partner/support person name and relationship  Alex (legal name is Franklin).        Symptoms since LMP include fatigue and constantly queasy. Patient has tried these relief   measures: small frequent meals and increased rest.    OB HISTORY  OB History    Para Term  AB Living   2 1 1 0 0 1   SAB IAB Ectopic Multiple Live Births   0 0 0 0 1      # Outcome Date GA Lbr Jackson/2nd Weight Sex Delivery Anes PTL Lv   2 Current            1 Term 21 40w3d  3.969 kg (8 lb 12 oz) M Vag-Spont Local N SURAJ      Name: ALEKSANDR,MALE      Apgar1: 6  Apgar5: 9      Obstetric Comments   NONE-first child born during pandemic.  River reports this was traumatic experience for her            OB COMPLICATIONS  First Pregnancy No  GDM No  HTNNo  Preeclampsia No   labor No   birth No   birth No  PP hemorrhage No  Retained placenta No  PP mood disorder No  Fetal anomaly No  FGR No  Macrosomia  No  3rd or 4th degree laceration  No  Shoulder dystocia No  NICU admit No       PERSONAL/SOCIAL HISTORY  *updated all tabs in history    lives with their family.  Employment: Unemployed .  Her partner works as a public interest .   MENTAL HEALTH HISTORY:        - Current Medications    Current Outpatient Medications   Medication Sig Dispense Refill    omega 3 1000 MG CAPS Take 1 capful. by mouth daily      acyclovir (ZOVIRAX) 400 MG tablet Take 1 tablet (400 mg) by mouth 2 times daily 90 tablet 6    lactobacillus rhamnosus, GG, (CULTURELL) capsule Take 1 capsule by mouth 2 times daily      Prenatal Vit-Fe Fumarate-FA (PRENATAL MULTIVITAMIN W/IRON) 27-0.8 MG tablet Take 1 tablet by mouth daily      terconazole (TERAZOL 7) 0.4 % vaginal cream Place 1 applicator vaginally At Bedtime 45 g 1           - Co-morbids    Past Medical  History:   Diagnosis Date    Herpes simplex virus (HSV) infection 2012    Known health problems: none     Pelvic floor dysfunction     Uncomplicated asthma 2003    Urinary incontinence 2021       - Genetic/Infection questionnaire completed, risks include possible ASD in FOBs father. Discussed genetic screening options, patient does desire first trimester genetic screening  Pt  does not have a recent known exposure to Parvo or CMV so IgG/IgM testing WILL NOT be ordered.   Flu Vaccine.  Patient declined, will consider next visit  COVID Vaccine had initial doses.  She has not had the 2023 booster.  She thinks that she had covid very early in outbreak.  She was interviewing Chinese/americ[place] before it was identified  - Discussed expectations for routine prenatal care and scheduling.  -Discussed highlights from The Expectant Family booklet on warning signs, safe pregnancy and prevention of infections diseases, nutrition and exercise.  - Patient was encouraged to start prenatal vitamins as tolerated, if experiencing nausea and vomiting then OK to switch to folic acid only at this time.    -Additional items: None  -Reconciled and reviewed problem list  -Pt scheduled for dating US and NOB on 10/13/23    Charity Espinal RN

## 2023-10-09 NOTE — PATIENT INSTRUCTIONS
Learning About Pregnancy  Your Care Instructions     Your health in the early weeks of your pregnancy is particularly important for your baby's health. Take good care of yourself. Anything you do that harms your body can also harm your baby.  Make sure to go to all of your doctor appointments. Regular checkups will help keep you and your baby healthy.  How can you care for yourself at home?  Diet    Eat a balanced diet. Make sure your diet includes plenty of beans, peas, and leafy green vegetables.     Do not skip meals or go for many hours without eating. If you are nauseated, try to eat a small, healthy snack every 2 to 3 hours.     Do not eat fish that has a high level of mercury, such as shark, swordfish, or mackerel. Do not eat more than one can of tuna each week.     Drink plenty of fluids. If you have kidney, heart, or liver disease and have to limit fluids, talk with your doctor before you increase the amount of fluids you drink.     Cut down on caffeine, such as coffee, tea, and cola.     Do not drink alcohol, such as beer, wine, or hard liquor.     Take a multivitamin that contains at least 400 micrograms (mcg) of folic acid to help prevent birth defects. Fortified cereal and whole wheat bread are good additional sources of folic acid.     Increase the calcium in your diet. Try to drink a quart of skim milk each day. You may also take calcium supplements and choose foods such as cheese and yogurt.   Lifestyle    Make sure you go to your follow-up appointments.     Get plenty of rest. You may be unusually tired while you are pregnant.     Get at least 30 minutes of exercise on most days of the week. Walking is a good choice. If you have not exercised in the past, start out slowly. Take several short walks each day.     Do not smoke. If you need help quitting, talk to your doctor about stop-smoking programs. These can increase your chances of quitting for good.     Do not touch cat feces or litter boxes.  Also, wash your hands after you handle raw meat, and fully cook all meat before you eat it. Wear gloves when you work in the yard or garden, and wash your hands well when you are done. Cat feces, raw or undercooked meat, and contaminated dirt can cause an infection that may harm your baby or lead to a miscarriage.     Avoid things that can make your body too hot and may be harmful to your baby, such as a hot tub or sauna. Or talk with your doctor before doing anything that raises your body temperature. Your doctor can tell you if it's safe.     Avoid chemical fumes, paint fumes, or poisons.     Do not use illegal drugs, marijuana, or alcohol.   Medicines    Review all of your medicines with your doctor. Some of your routine medicines may need to be changed to protect your baby.     Use acetaminophen (Tylenol) to relieve minor problems, such as a mild headache or backache or a mild fever with cold symptoms. Do not use nonsteroidal anti-inflammatory drugs (NSAIDs), such as ibuprofen (Advil, Motrin) or naproxen (Aleve), unless your doctor says it is okay.     Do not take two or more pain medicines at the same time unless the doctor told you to. Many pain medicines have acetaminophen, which is Tylenol. Too much acetaminophen (Tylenol) can be harmful.     Take your medicines exactly as prescribed. Call your doctor if you think you are having a problem with your medicine.   To manage morning sickness    If you feel sick when you first wake up, try eating a small snack (such as crackers) before you get out of bed. Allow some time to digest the snack, and then get out of bed slowly.     Do not skip meals or go for long periods without eating. An empty stomach can make nausea worse.     Eat small, frequent meals instead of three large meals each day.     Drink plenty of fluids.     Eat foods that are high in protein but low in fat.     If you are taking iron supplements, ask your doctor if they are necessary. Iron can make  "nausea worse.     Avoid any smells, such as coffee, that make you feel sick.     Get lots of rest. Morning sickness may be worse when you are tired.   Follow-up care is a key part of your treatment and safety. Be sure to make and go to all appointments, and call your doctor if you are having problems. It's also a good idea to know your test results and keep a list of the medicines you take.  Where can you learn more?  Go to https://www.[email or web address].net/patiented  Enter E868 in the search box to learn more about \"Learning About Pregnancy.\"  Current as of: November 9, 2022               Content Version: 13.7    5469-8640 [employer].   Care instructions adapted under license by your healthcare professional. If you have questions about a medical condition or this instruction, always ask your healthcare professional. [employer] disclaims any warranty or liability for your use of this information.      Weeks 6 to 10 of Your Pregnancy: Care Instructions  During these weeks of pregnancy, your body goes through many changes. You may start to feel different, both in your body and your emotions. Each pregnancy is different, so there's no \"right\" way to feel. These early weeks are a time to make healthy choices for you and your pregnancy.    Take a daily prenatal vitamin. Choose one with folic acid in it.   Avoid alcohol, tobacco, and drugs (including marijuana). If you need help quitting, talk to your doctor.     Drink plenty of liquids.  Be sure to drink enough water. And limit sodas, other sweetened drinks, and caffeine.     Choose foods that are good sources of calcium, iron, and folate.  You can try dairy products, dark leafy greens, fortified orange juice and cereals, almonds, broccoli, dried fruit, and beans.     Avoid foods that may be harmful.  Don't eat raw meat, deli meat, raw seafood, or raw eggs. Avoid soft cheese and unpasteurized dairy, like Brie and blue cheese. And don't eat fish that " "contains a lot of mercury, like shark and swordfish.     Don't touch galen litter or cat poop.  They can cause an infection that could be harmful during pregnancy.     Avoid things that can make your body too hot.  For example, avoid hot tubs and saunas.     Soothe morning sickness.  Try eating 5 or 6 small meals a day, getting some fresh air, or using josey to control symptoms.     Ask your doctor about flu and COVID-19 shots.  Getting them can help protect against infection.   Follow-up care is a key part of your treatment and safety. Be sure to make and go to all appointments, and call your doctor if you are having problems. It's also a good idea to know your test results and keep a list of the medicines you take.  Where can you learn more?  Go to [email or web address]://[email or web address]/patiented  Enter G112 in the search box to learn more about \"Weeks 6 to 10 of Your Pregnancy: Care Instructions.\"  Current as of: November 9, 2022               Content Version: 13.7 2006-2023 [employer].   Care instructions adapted under license by your healthcare professional. If you have questions about a medical condition or this instruction, always ask your healthcare professional. [employer] disclaims any warranty or liability for your use of this information.         Managing Morning Sickness (01:55)  Your health professional recommends that you watch this short online health video.  Learn tips for dealing with morning sickness, no matter what time of day you have it.  Purpose:  Gives tips for managing morning sickness, including eating small low-fat meals and avoiding caffeine and spicy food.  Goal:  The user will learn tips for dealing with morning sickness during pregnancy.     How to watch the video    Scan the QR code   OR Visit the website    [email or web address]   Current as of: November 9, 2022               Content Version: 13.7 2006-2023 [employer].   Care instructions " adapted under license by your healthcare professional. If you have questions about a medical condition or this instruction, always ask your healthcare professional. [employer], [employer] disclaims any warranty or liability for your use of this information.      Pregnancy and Heartburn: Care Instructions  Overview     Heartburn is a common problem during pregnancy.  Heartburn happens when stomach acid backs up into the tube that carries food to the stomach. This tube is called the esophagus. Early in pregnancy, heartburn is caused by hormone changes that slow down digestion. Later on, it's also caused by the large uterus pushing up on the stomach.  Even though you can't fix the cause, there are things you can do to get relief. Treating heartburn during pregnancy focuses first on making lifestyle changes, like changing what and how you eat, and on taking medicines.  Heartburn usually improves or goes away after childbirth.  Follow-up care is a key part of your treatment and safety. Be sure to make and go to all appointments, and call your doctor if you are having problems. It's also a good idea to know your test results and keep a list of the medicines you take.  How can you care for yourself at home?  Eat small, frequent meals.  Avoid foods that make your symptoms worse, such as chocolate, peppermint, and spicy foods. Avoid drinks with caffeine, such as coffee, tea, and sodas.  Avoid bending over or lying down after meals.  Take a short walk after you eat.  If heartburn is a problem at night, do not eat for 2 hours before bedtime.  Take antacids like Mylanta, Maalox, Rolaids, or Tums. Do not take antacids that have sodium bicarbonate, magnesium trisilicate, or aspirin. Be careful when you take over-the-counter antacid medicines. Many of these medicines have aspirin in them. While you are pregnant, do not take aspirin or medicines that contain aspirin unless your doctor says it is okay.  If you're not getting  "relief, talk to your doctor. You may be able to take a stronger acid-reducing medicine.  When should you call for help?   Call your doctor now or seek immediate medical care if:    You have new or worse belly pain.     You are vomiting.   Watch closely for changes in your health, and be sure to contact your doctor if:    You have new or worse symptoms of reflux.     You are losing weight.     You have trouble or pain swallowing.     You do not get better as expected.   Where can you learn more?  Go to https://www.[email or web address].net/patiented  Enter U946 in the search box to learn more about \"Pregnancy and Heartburn: Care Instructions.\"  Current as of: November 9, 2022               Content Version: 13.7 2006-2023 [employer].   Care instructions adapted under license by your healthcare professional. If you have questions about a medical condition or this instruction, always ask your healthcare professional. [employer] disclaims any warranty or liability for your use of this information.      Constipation: Care Instructions  Overview     Constipation means that you have a hard time passing stools (bowel movements). People pass stools from 3 times a day to once every 3 days. What is normal for you may be different. Constipation may occur with pain in the rectum and cramping. The pain may get worse when you try to pass stools. Sometimes there are small amounts of bright red blood on toilet paper or the surface of stools. This is because of enlarged veins near the rectum (hemorrhoids).  A few changes in your diet and lifestyle may help you avoid ongoing constipation. Your doctor may also prescribe medicine to help loosen your stool.  Some medicines can cause constipation. These include pain medicines and antidepressants. Tell your doctor about all the medicines you take. Your doctor may want to make a medicine change to ease your symptoms.  Follow-up care is a key part of your treatment and " "safety. Be sure to make and go to all appointments, and call your doctor if you are having problems. It's also a good idea to know your test results and keep a list of the medicines you take.  How can you care for yourself at home?  Drink plenty of fluids. If you have kidney, heart, or liver disease and have to limit fluids, talk with your doctor before you increase the amount of fluids you drink.  Include high-fiber foods in your diet each day. These include fruits, vegetables, beans, and whole grains.  Get at least 30 minutes of exercise on most days of the week. Walking is a good choice. You also may want to do other activities, such as running, swimming, cycling, or playing tennis or team sports.  Take a fiber supplement, such as Citrucel or Metamucil, every day. Read and follow all instructions on the label.  Schedule time each day for a bowel movement. A daily routine may help. Take your time having a bowel movement, but don't sit for more than 10 minutes at a time. And don't strain too much.  Support your feet with a small step stool when you sit on the toilet. This helps flex your hips and places your pelvis in a squatting position.  Your doctor may recommend an over-the-counter laxative to relieve your constipation. Examples are Milk of Magnesia and MiraLax. Read and follow all instructions on the label. Do not use laxatives on a long-term basis.  When should you call for help?   Call your doctor now or seek immediate medical care if:    You have new or worse belly pain.     You have new or worse nausea or vomiting.     You have blood in your stools.   Watch closely for changes in your health, and be sure to contact your doctor if:    Your constipation is getting worse.     You do not get better as expected.   Where can you learn more?  Go to https://www.healthwise.net/patiented  Enter P343 in the search box to learn more about \"Constipation: Care Instructions.\"  Current as of: March 22, " "2023               Content Version: 13.7 2006-2023 [employer].   Care instructions adapted under license by your healthcare professional. If you have questions about a medical condition or this instruction, always ask your healthcare professional. [employer] disclaims any warranty or liability for your use of this information.      Learning About High-Iron Foods  What foods are high in iron?     The foods you eat contain nutrients, such as vitamins and minerals. Iron is a nutrient. Your body needs the right amount to stay healthy and work as it should. You can use the list below to help you make choices about which foods to eat.  Here are some foods that contain iron. They have 1 to 2 milligrams of iron per serving.  Fruits  Figs (dried), 5 figs  Vegetables  Asparagus (canned), 6 munoz  Jenna, beet, Swiss chard, or turnip greens, 1 cup  Dried peas, cooked,   cup  Seaweed, spirulina (dried),   cup  Spinach, (cooked)   cup or (raw) 1 cup  Grains  Cereals, fortified with iron, 1 cup  Grits (instant, cooked), fortified with iron,   cup  Meats and other protein foods  Beans (kidney, lima, navy, white), canned or cooked,   cup  Beef or lamb, 3 oz  Chicken giblets, 3 oz  Chickpeas (garbanzo beans),   cup  Liver of beef, lamb, or pork, 3 oz  Oysters (cooked), 3 oz  Sardines (canned), 3 oz  Soybeans (boiled),   cup  Tofu (firm),   cup  Work with your doctor to find out how much of this nutrient you need. Depending on your health, you may need more or less of it in your diet.  Where can you learn more?  Go to https://www.[employer]CAIS.net/patiented  Enter R005 in the search box to learn more about \"Learning About High-Iron Foods.\"  Current as of: March 1, 2023               Content Version: 13.7 2006-2023 [employer].   Care instructions adapted under license by your healthcare professional. If you have questions about a medical condition or this instruction, always ask your " "healthcare professional. [employer], [facility] disclaims any warranty or liability for your use of this information.      Rh Antibodies Screening During Pregnancy: About This Test  What is it?     The Rh antibodies screening test is a blood test. It checks your blood for Rh antibodies. If you have Rh-negative blood and have been exposed to Rh-positive blood, your immune system may make antibodies to attack the Rh-positive blood. When a pregnant woman has these antibodies, it is called Rh sensitization.  Why is this test done?  The Rh antibodies screening test is done during pregnancy to find out if your baby is at risk for Rh disease. This can happen if you have Rh-negative blood and your baby has Rh-positive blood. If your Rh-negative blood mixes with Rh-positive blood, your immune system will make antibodies to attack the Rh-positive blood.  During pregnancy, these antibodies could attach to the baby's red blood cells. This can cause your baby to have serious health problems. The results of this test will help your doctor know how to best care for you and your baby during your pregnancy.  How do you prepare for the test?  In general, there's nothing you have to do before this test, unless your doctor tells you to.  How is the test done?  A health professional uses a needle to take a blood sample, usually from the arm.  What happens after the test?  You will probably be able to go home right away. It depends on the reason for the test.  You can go back to your usual activities right away.  Follow-up care is a key part of your treatment and safety. Be sure to make and go to all appointments, and call your doctor if you are having problems. It's also a good idea to keep a list of the medicines you take. Ask your doctor when you can expect to have your test results.  Where can you learn more?  Go to https://www.[employer].net/patiented  Enter P722 in the search box to learn more about \"Rh Antibodies Screening " "During Pregnancy: About This Test.\"  Current as of: 2022               Content Version: 13.7    [id number] [employer].   Care instructions adapted under license by your healthcare professional. If you have questions about a medical condition or this instruction, always ask your healthcare professional. [employer] disclaims any warranty or liability for your use of this information.      Learning About Preventing Rh Disease  What is Rh disease?     Rh disease can be a serious problem in pregnancy. It happens when substances called antibodies in the mother's blood cause red blood cells in her baby's blood to be destroyed. This can occur when the blood types of a mother and her baby do not match.  All blood has an Rh factor. This is what makes a blood type positive or negative. When you are Rh-negative, your baby may be Rh-negative or Rh-positive. If your baby has Rh-positive blood and it mixes with yours, your body will make antibodies. This is called Rh sensitization.  Most of the time, this is not a problem in a first pregnancy. But in future pregnancies, it could cause Rh disease.  A  with Rh disease has mild anemia and may have jaundice. In severe cases, anemia, jaundice, and swelling can be very dangerous or fatal. Some babies need to be delivered early. Some need special care in the NICU. A very sick baby will need a blood transfusion before or after birth.  Fortunately, Rh sensitization is usually easy to prevent.  That's why it's important to get your Rh status checked in your first trimester. It doesn't cause any warning signs. A blood test is the only way to know if you are Rh-sensitive or are at risk for it.  How can you prevent Rh disease?  If you are Rh-negative, your doctor gives you an Rh immune globulin shot (such as RhoGAM). It helps prevent your body from making the antibodies that attack your baby's red blood cells.  Timing is important. You need the " "shot at certain times during your pregnancy. And you need one anytime there is a chance that your baby's blood might mix with yours. That can happen with certain prenatal tests or when you have pregnancy bleeding, such as:  Right after any pregnancy loss, amniocentesis, or CVS testing.  After turning of a breech baby.  Before and maybe after childbirth. Your doctor gives you a shot around week 28. If your  is Rh-positive, you will have another shot.  Follow-up care is a key part of your treatment and safety. Be sure to make and go to all appointments, and call your doctor if you are having problems. It's also a good idea to know your test results and keep a list of the medicines you take.  Where can you learn more?  Go to [email or web address]/[email or web address]  Enter W177 in the search box to learn more about \"Learning About Preventing Rh Disease.\"  Current as of: 2022               Content Version: 13.7    8072-7190 [employer].   Care instructions adapted under license by your healthcare professional. If you have questions about a medical condition or this instruction, always ask your healthcare professional. [employer] disclaims any warranty or liability for your use of this information.      Learning About Rh Immunoglobulin Shots  Introduction     An Rh immunoglobulin shot is given to pregnant women who have Rh-negative blood.  You may have Rh-negative blood, and your baby may have Rh-positive blood. If the two types of blood mix, your body will make antibodies. This is called Rh sensitization. Most of the time, this is not a problem the first time you're pregnant. But it could cause problems in future pregnancies.  This shot keeps your body from making the antibodies. You get the shot around 28 weeks of pregnancy. After the birth, your baby's blood is tested. If the blood is Rh positive, you will get another shot. You may also get the shot if you have vaginal bleeding " "while you are pregnant or if you have a miscarriage. These shots protect future pregnancies.  Women with Rh negative blood will need this shot each time they get pregnant.  Example  Rh immunoglobulin (HypRho-D, MICRhoGAM, and RhoGAM)  Possible side effects  Rare side effects may include:  Some mild pain where you got the shot.  A slight fever.  An allergic reaction.  You may have other side effects not listed here. Check the information that comes with your medicine.  What to know about taking this medicine  You may need more than one shot. You may need the shot again:  After amniocentesis, fetal blood sampling, or chorionic villus sampling tests.  If you have bleeding in your second or third trimester.  After turning of a breech baby.  After an injury to the belly while you are pregnant.  After a miscarriage or an .  Before or right after treatment for an ectopic or a partial molar pregnancy.  Tell your doctor if you have any allergies or have had a bad response to medicines in the past.  If you get this shot within 3 months of getting a live-virus vaccine, the vaccine may not work. Your doctor will tell you if you need more vaccine.  Check with your doctor or pharmacist before you use any other medicines. This includes over-the-counter medicines. Make sure your doctor knows all of the medicines, vitamins, herbs, and supplements you take. Taking some medicines at the same time can cause problems.  Where can you learn more?  Go to https://[email or web address].net/[email or web address]  Enter V615 in the search box to learn more about \"Learning About Rh Immunoglobulin Shots.\"  Current as of: 2022               Content Version: 13.7    8044-9218 [employer].   Care instructions adapted under license by your healthcare professional. If you have questions about a medical condition or this instruction, always ask your healthcare professional. "[employer] disclaims any warranty or liability for " your use of this information.      Rubella (Nepalese Measles): Care Instructions  Overview  Rubella, also called Nepalese measles or 3-day measles, is a disease caused by a virus. It spreads by coughs, sneezes, and close contact. Rubella usually is mild and does not cause long-term problems. But if you are pregnant and get it, you can give the disease to your unborn baby. This can cause serious birth defects.  While you have rubella, you may get a rash and a mild fever, and the lymph glands in your neck may swell. Older children often have a fever, eye pain, a sore throat, and body aches. You can relieve most symptoms with care at home. Avoid being around others, especially pregnant people, until your rash has been gone for at least 4 days. People who have not had this disease before or have not had the vaccine have the greatest chance of getting the virus.  Follow-up care is a key part of your treatment and safety. Be sure to make and go to all appointments, and call your doctor if you are having problems. It's also a good idea to know your test results and keep a list of the medicines you take.  How can you care for yourself at home?  Drink plenty of fluids. If you have kidney, heart, or liver disease and have to limit fluids, talk with your doctor before you increase the amount of fluids you drink.  Get plenty of rest to help your body heal.  Take an over-the-counter pain medicine, such as acetaminophen (Tylenol), ibuprofen (Advil, Motrin), or naproxen (Aleve), to reduce fever and discomfort. Read and follow all instructions on the label. Do not give aspirin to anyone younger than 20. It has been linked to Reye syndrome, a serious illness.  Do not take two or more pain medicines at the same time unless the doctor told you to. Many pain medicines have acetaminophen, which is Tylenol. Too much acetaminophen (Tylenol) can be harmful.  Try not to scratch the rash. Put cold, wet cloths on the rash to reduce itching.  Do  "not smoke. Smoking can make your symptoms worse. If you need help quitting, talk to your doctor about stop-smoking programs and medicines. These can increase your chances of quitting for good.  Avoid contact with people who have never had rubella and who have not been immunized.  When should you call for help?   Call your doctor now or seek immediate medical care if:    You have a fever with a stiff neck or a severe headache.     You are sensitive to light or feel very sleepy or confused.   Watch closely for changes in your health, and be sure to contact your doctor if:    You do not get better as expected.   Where can you learn more?  Go to https://www.BIOSAFE.net/patiented  Enter B812 in the search box to learn more about \"Rubella ([place] Measles): Care Instructions.\"  Current as of: 2022               Content Version: 13.7    5546-1608 [employer].   Care instructions adapted under license by your healthcare professional. If you have questions about a medical condition or this instruction, always ask your healthcare professional. [employer] disclaims any warranty or liability for your use of this information.      Gonorrhea and Chlamydia: About These Tests  What is it?  These tests use a sample of urine or other body fluid to look for the bacteria that cause these sexually transmitted infections (STIs). The fluid sample can come from the cervix, vagina, rectum, throat, or eyes.  Why is this test done?  These tests may be done to:  Find out if symptoms are caused by gonorrhea or chlamydia.  Check people who are at high risk of being infected with gonorrhea or chlamydia.  Retest people several months after they have been treated for gonorrhea or chlamydia.  Check for infection in your  if you had a gonorrhea or chlamydia infection at the time of delivery.  How can you prepare for the test?  If you are going to have a urine test, do not urinate for at least 1 hour " "before the test.  If you think you may have chlamydia or gonorrhea, don't have sexual intercourse until you get your test results. And you may want to have tests for other STIs, such as HIV.  How is the test done?  For a direct sample, a swab is used to collect body fluid from the cervix, vagina, rectum, throat, or eyes. Your doctor may collect the sample. Or you may be given instructions on how to collect your own sample.  For a urine sample, you will collect the urine that comes out when you first start to urinate. Don't wipe the genital area clean before you urinate.  How long does the test take?  The test will take a few minutes.  What happens after the test?  You will be able to go home right away.  You can go back to your usual activities right away.  If you do have an infection, don't have sexual intercourse for 7 days after you start treatment. And your sex partner(s) should also be treated.  Follow-up care is a key part of your treatment and safety. Be sure to make and go to all appointments, and call your doctor if you are having problems. It's also a good idea to keep a list of the medicines you take. Ask your doctor when you can expect to have your test results.  Where can you learn more?  Go to https://www.LoanTek.net/patiented  Enter K976 in the search box to learn more about \"Gonorrhea and Chlamydia: About These Tests.\"  Current as of: August 2, 2022               Content Version: 13.7    1189-7259 [employer].   Care instructions adapted under license by your healthcare professional. If you have questions about a medical condition or this instruction, always ask your healthcare professional. [employer] disclaims any warranty or liability for your use of this information.      Trichomoniasis: About This Test  What is it?     This test uses a sample of urine or other body fluid to look for the tiny parasite that causes trichomoniasis (also called trich). The fluid sample " can come from the vagina, cervix, or urethra. Your doctor may choose to use one or more of many available tests.  Why is it done?  A trich test may be done to:  Find out if symptoms are caused by trich.  Check people who are at high risk for being infected with trich.  Check after treatment to make sure that the infection is gone.  How do you prepare for the test?  If you are going to have a urine test, do not urinate for at least 1 hour before the test.  How is the test done?  For a direct sample, a swab is used to collect body fluid from the cervix, vagina, or urethra. Your doctor may collect the sample. Or you may be given instructions on how to collect your own sample.  For a urine sample, you will collect the urine that comes out when you first start to urinate. Don't wipe the area clean before you urinate.  How long does the test take?  It will take a few minutes to collect a sample.  What happens after the test?  You can go home right away.  You can go back to your usual activities right away.  You may get the test results the same day or several days later. It depends on the test used.  If you do have an infection, don't have sexual intercourse for 7 days after you start treatment. Your sex partner(s) should also be treated.  Follow-up care is a key part of your treatment and safety. Be sure to make and go to all appointments, and call your doctor if you are having problems. Ask your doctor when you can expect to have your test results.  Current as of: August 2, 2022               Content Version: 13.7    3942-8476 [employer].   Care instructions adapted under license by your healthcare professional. If you have questions about a medical condition or this instruction, always ask your healthcare professional. [employer] disclaims any warranty or liability for your use of this information.      HIV Testing: Care Instructions  Overview     You can get tested for the human  "immunodeficiency virus (HIV). This test checks for HIV antibodies and antigens in your blood. If they are found, the test is positive.  If HIV antibodies or antigens are not found, you may need a repeat test to be sure the results are correct. Or your doctor may want to do a different test.  Follow-up care is a key part of your treatment and safety. Be sure to make and go to all appointments, and call your doctor if you are having problems. It's also a good idea to know your test results and keep a list of the medicines you take.  What do the results mean?  Normal result  A normal result means that no HIV antibodies or antigens were found in your blood. And if you had a test that checked for HIV RNA or DNA, none was found. Normal results are called negative.  You may need more testing to be sure the test results are correct.  Uncertain result  Test results don't clearly show whether you have an HIV infection. This is usually called an indeterminate result. This may happen before HIV antibodies or antigens develop. Or it may happen when some other type of antibody or antigen interferes with the results. If this occurs, you will probably have another test right away.  Abnormal result  An abnormal result means that you have HIV antibodies or antigens in your blood. These results are called positive.  A positive test will be confirmed by another type of test. This is because some tests can cause false-positive results. No one is considered HIV-positive until the result is confirmed by a test that shows HIV RNA or DNA in the person's blood.  If your test result is positive, your doctor will talk to you about starting treatment.  Where can you learn more?  Go to https://www.[email or web address] [email or web address]/patiented  Enter T792 in the search box to learn more about \"HIV Testing: Care Instructions.\"  Current as of: October 31, 2022               Content Version: 13.7    8680-1650 [employer].   Care instructions adapted under " license by your healthcare professional. If you have questions about a medical condition or this instruction, always ask your healthcare professional. Healthwise[employer] Inc[employer] disclaims any warranty or liability for your use of this information.      Hepatitis C Virus Tests: About These Tests  What are they?     Hepatitis C virus tests are blood tests that check for substances in the blood that show whether you have hepatitis C now or had it in the past. The tests can also tell you what type of hepatitis C you have and how severe the disease is. This can help your doctor with treatment.  If the tests show that you have long-term hepatitis C, you need to take steps to prevent spreading the disease.  Why are these tests done?  You may need these tests if:  You have symptoms of hepatitis.  You may have been exposed to the virus. You are more likely to have been exposed to the virus if you inject drugs or are exposed to body fluids (such as if you are a health care worker).  You've had other tests that show you have liver problems.  You are 18 to 79 years old.  You have an HIV infection.  The tests also are done to help your doctor decide about your treatment and see how well it works.  How do you prepare for the test?  In general, there's nothing you have to do before this test, unless your doctor tells you to.  How is the test done?  A health professional uses a needle to take a blood sample, usually from the arm.  What happens after these tests?  You will probably be able to go home right away.  You can go back to your usual activities right away.  Follow-up care is a key part of your treatment and safety. Be sure to make and go to all appointments, and call your doctor if you are having problems. It's also a good idea to keep a list of the medicines you take. Ask your doctor when you can expect to have your test results.  Where can you learn more?  Go to https://www.[email or web address].net/patiented  Enter W551 in the search  "box to learn more about \"Hepatitis C Virus Tests: About These Tests.\"  Current as of: October 31, 2022               Content Version: 13.7    7660-6550 [employer].   Care instructions adapted under license by your healthcare professional. If you have questions about a medical condition or this instruction, always ask your healthcare professional. [employer] disclaims any warranty or liability for your use of this information.      Learning About Fetal Ultrasound Results  What is a fetal ultrasound?     Fetal ultrasound is a test that lets your doctor see an image of your baby. Your doctor learns information about your baby from this picture. You may find out, for example, if you are having a boy or a girl. But the main reason you have this test is to get information about your baby's health.  (You may hear your baby called a fetus. This is a common medical term for a baby that's growing in the mother's uterus.)  What kind of information can you learn from this test?  The findings of an ultrasound fall into two categories, normal and abnormal.  Normal  The fetus is the right size for its age.  The placenta is the expected size and does not cover the cervix.  There is enough amniotic fluid in the uterus.  No birth defects can be seen.  Abnormal  The fetus is small or large for its age.  The placenta covers the cervix.  There is too much or too little amniotic fluid in the uterus.  The fetus may have a birth defect.  What does an abnormal result mean?  Abnormal seems to imply that something is wrong with your baby. But what it means is that the test has shown something the doctor wants to take a closer look at.  And that's what happens next. Your doctor will talk to you about what further test or tests you may need.  What do the results mean?  Some of the things your doctor may see on an abnormal ultrasound include:  Echogenic bowel.  The bowel looks very bright on the screen. This could " mean that there's blood in the bowel. Or it could mean that something is blocking the small bowel.  Increased nuchal translucency.  The ultrasound measures the thickness at the back of the baby's neck. An increase in thickness is sometimes an early sign of Down syndrome.  Increased or decreased amniotic fluid.  The doctor will look for a reason for the level of amniotic fluid and will watch the pregnancy closely as it progresses.  Large ventricles.  Ventricles in the brain look larger than they should. Your doctor may take a closer look at the brain.  Renal pyelectasis/hydronephrosis.  The ultrasound measures the fluid around the kidney. If there is more fluid than expected, there is a chance of urinary tract or kidney problems.  Short long bones.  The ultrasound measures certain arm and leg bones. A long bone (humerus or femur) that is shorter than average could be a sign of Down syndrome.  Subchorionic hemorrhage.  An ultrasound can show bleeding under one of the membranes that surrounds the fetus. Some women don't have symptoms of bleeding. The ultrasound can find this problem when women are not bleeding from their vagina. Women who have this condition have a slightly higher chance of miscarriage.  What do you do now?  Take a deep breath, and let it out. Keep in mind that an abnormal finding on an ultrasound, after it's coupled with more information, may:  Turn out to be nothing.  Turn out to be something mild that won't affect the baby.  Turn out to be something more serious. But if this happens, early diagnosis helps you and your doctor plan treatment options sooner rather than later.  Your medical team is there for you. So are your family and friends. Ask questions, and get the help and support you need.  Follow-up care is a key part of your treatment and safety. Be sure to make and go to all appointments, and call your doctor if you are having problems. It's also a good idea to know your test results and keep  "a list of the medicines you take.  Where can you learn more?  Go to https://www.Kudos Knowledge.net/patiented  Enter K451 in the search box to learn more about \"Learning About Fetal Ultrasound Results.\"  Current as of: November 9, 2022               Content Version: 13.7    9661-0898 [employer].   Care instructions adapted under license by your healthcare professional. If you have questions about a medical condition or this instruction, always ask your healthcare professional. [employer] disclaims any warranty or liability for your use of this information.      Learning About Prenatal Visits  Your Care Instructions     Regular prenatal visits are very important during any pregnancy. These quick office visits may seem simple and routine. But they can help you and your baby stay healthy. Your doctor is watching for problems that can only be found by regularly checking you and your baby. The visits also give you and your doctor time to build a good relationship.  Many women have prenatal visits every 4 to 6 weeks until week 28 of pregnancy. Then the visits become more frequent. This is often every 2 to 3 weeks through week 36 of pregnancy. In the final month of pregnancy, you likely will see your doctor every week. You may have a different schedule if you have a medical problem or are a teen.  At different times in your pregnancy, you will have exams and tests. Some are routine. Others are done only when there is a chance of a problem. Everything healthy you do for your body helps your growing baby. Rest when you need it. Eat well, drink plenty of water, and exercise regularly.  What happens during a prenatal visit?  You will have blood pressure checks, along with urine tests. You also may have blood tests. If you need to go to the bathroom while waiting for the doctor, tell the nurse. He or she will give you a sample cup so your urine can be tested.  You will be weighed and have your belly " measured.  Your doctor may listen to your baby's heartbeat with a special stethoscope.  In your second trimester, your doctor will check your blood sugar (glucose tolerance test) for diabetes that can occur during pregnancy. This is gestational diabetes, which can harm your baby.  You will have tests to check for infections that could harm your . These include group B streptococcus and hepatitis B.  Your doctor may do ultrasounds to check for problems. This also checks your baby's position. An ultrasound uses sound waves to produce a picture of your baby.  You may have other tests at any time during your pregnancy.  Use your visits to discuss with your doctor any concerns you have.  How can you care for yourself at home?  Get plenty of rest.  Exercise every day, if your doctor says it is okay. If you have not exercised in the past, start out slowly. Take many short walks each day.  Eat a balanced diet. Make sure your diet includes plenty of beans, peas, and leafy green vegetables.  Drink plenty of fluids. Cut down on drinks with caffeine, such as coffee, tea, and cola. If you have kidney, heart, or liver disease and have to limit fluids, talk with your doctor before you increase the amount of fluids you drink.  Avoid chemical fumes, paint fumes, and poisons. Do not use alcohol, marijuana, or illegal drugs. Do not smoke, vape, or use tobacco. If you need help quitting, talk to your doctor about stop-smoking programs and medicines. These can increase your chances of quitting for good.  Review all of your medicines with your doctor. Some of your routine medicines may need to be changed to protect your baby. Do not stop or start taking any medicines without talking to your doctor first.  Follow-up care is a key part of your treatment and safety. Be sure to make and go to all appointments, and call your doctor if you are having problems. It's also a good idea to know your test results and keep a list of the  "medicines you take.  Where can you learn more?  Go to https://www.healthGreenNote.net/patiented  Enter J502 in the search box to learn more about \"Learning About Prenatal Visits.\"  Current as of: November 9, 2022               Content Version: 13.7    [id number] [employer].   Care instructions adapted under license by your healthcare professional. If you have questions about a medical condition or this instruction, always ask your healthcare professional. [employer] disclaims any warranty or liability for your use of this information.      Domestic Abuse: Care Instructions  Overview     If you want to save this information but don't think it is safe to take it home, see if a trusted friend can keep it for you. Plan ahead. Know who you can call for help, and memorize the phone number.   Be careful online too. Your online activity may be seen by others. Do not use your personal computer or device to read about this topic. Use a safe computer such as one at work, a friend's house, or a library.    Domestic abuse is different from an argument now and then. It is a pattern of abuse that one person uses to control another person's behavior. It may start with threats and name-calling. Then, it may lead to more serious acts, like pushing and slapping. The abuse also may occur in other areas. For example, the abuser may withhold money or spend a partner's money without their knowledge.  Abuse can cause serious harm. You are more likely to have a long-term health problem from the injuries and stress of living in a violent relationship. People who are sexually abused by their partners have more sexually transmitted infections and unwanted pregnancies. Anyone can be abused in relationships. Anyone who is abused also faces emotional pain.  If you are pregnant, abuse can cause problems such as poor weight gain, infections, and bleeding. Abuse during this time may increase your baby's risk of low birth " "weight, premature birth, and death.  Follow-up care is a key part of your treatment and safety. Be sure to make and go to all appointments, and call your doctor if you are having problems. It's also a good idea to know your test results and keep a list of the medicines you take.  How can you care for yourself at home?  If you do not have a safe place to stay, discuss this with your doctor before you leave.  Have a plan for where to go, how to leave your house, and where to stay in case of an emergency. Do not tell your partner about your plan. Contact:  The National Domestic Violence Hotline toll-free at 1-409.627.3071. They can help you find resources in your area.  Your local police department, hospital, or clinic for information about shelters and safe homes near you.  Talk to a trusted friend or neighbor or a Christianity counselor. Do not feel that you have to hide what happened.  Teach your children how to call for help in an emergency.  Be alert to warning signs, such as threats, heavy alcohol use, or drug use. This can help you avoid danger.  If you can, make sure that there are no guns or other weapons in the house.  When should you call for help?   Call 911 anytime you think you may need emergency care. For example, call if:    You or someone else has just been abused.     You think you or someone else is in danger of being abused.   Watch closely for changes in your health, and be sure to contact your doctor if you have any problems.  Where can you learn more?  Go to https://www.[employer].net/patiented  Enter G282 in the search box to learn more about \"Domestic Abuse: Care Instructions.\"  Current as of: February 27, 2023               Content Version: 13.7    0893-3902 [employer].   Care instructions adapted under license by your healthcare professional. If you have questions about a medical condition or this instruction, always ask your healthcare professional. Healthwise, [employer] " disclaims any warranty or liability for your use of this information.      Domestic Violence Safety Instructions: Care Instructions  Overview     If you want to save this information but don't think it is safe to take it home, see if a trusted friend can keep it for you. Plan ahead. Know who you can call for help, and memorize the phone number.   Be careful online too. Your online activity may be seen by others. Do not use your personal computer or device to read about this topic. Use a safe computer such as one at work, a friend's house, or a library.    When you are abused by a spouse or partner, you can take actions to protect yourself and your children.  You can increase your safety whether you decide to stay with your spouse or partner or you decide to leave. You can also prepare an action plan and kit ahead of time. This will allow you to leave quickly when you decide to. Remember, you cannot stop your partner's abuse, but you can find help for you and your children. No one deserves to be abused.  Follow-up care is a key part of your treatment and safety. Be sure to make and go to all appointments, and call your doctor if you are having problems. It's also a good idea to know your test results and keep a list of the medicines you take.  How can you care for yourself at home?  Make a plan for your safety   If you decide to stay with your abusive spouse or partner, you can do the following to increase your safety:  Decide what works best to keep you safe in an emergency.  Decide who you can call to help you in an emergency.  Decide if you will call the police if you get hurt again. If you can, agree on a signal with your children or neighbor to call the police for you if you need help. You can flash lights or hang something out of a window.  Choose a place to go for a short time if you need to leave home. Memorize the address and phone number.  Learn escape routes out of your home in case you need to leave in a  hurry. Teach your children different ways to get out of the house quickly if they need to.  Take objects that can be used as weapons (guns, knives, hammers) and hide them or lock them up.  Learn the number of a domestic violence shelter. Talk to the people there about how they can help.  Find out about other community resources that can help you.  Take pictures of bruises or other injuries if you can. You can also take pictures of things your abuser has broken.  Teach your children that violence is never okay. Tell them that they do not deserve to be hurt.  Pack a bag   Prepare a kit with things you will need if you leave the house suddenly. You can try to hide this in your house, or you can leave it with a friend or relative you can trust. You should include the following items in the kit:  A set of keys to your house and car.  Emergency phone numbers and addresses. You might also want to have a map and a small flashlight in case you need to leave in the night.  Money such as cash or checks. You can also ask a trusted friend or family member to hold money for you.  Copies of legal documents such as house and car titles or rent receipts, birth certificates, Social Security card, voter registration, marriage and 's licenses, and your children's health records.  Personal items you would need for a few days, such as clothes, a toothbrush, toothpaste, and any medicines you or your children need.  A favorite toy or book for your child or children.  Diapers and bottles, if you have very young children.  Pictures that show signs of abuse and violence. You may also add pictures of your abuser.  If you leave   If you decide to leave, you can take the following steps:  Go to the emergency room at a hospital if you have been hurt.  Ask the police to be with you as you leave. They can protect you as you leave the house.  If you decide to leave secretly, remember that activities can be tracked. Your abuser may still have  "access to your cell phone, email, and credit cards. It may be possible for these to be traced. Always be aware of your surroundings.  Take the kit you have prepared. If this is an emergency, do not worry about gathering up anything. Just leave--your safety is most important.  If your abuser moves out, change the locks on the doors. If you have a security system, change the access code.  When should you call for help?   Call 911 anytime you think you may need emergency care. For example, call if:    You or someone else has just been abused.     You think you or someone else is in danger of being abused.   Watch closely for changes in your health, and be sure to contact your doctor if you have any problems.  Where can you learn more?  Go to https://www.Matter.io.net/patiented  Enter A752 in the search box to learn more about \"Domestic Violence Safety Instructions: Care Instructions.\"  Current as of: October 20, 2022               Content Version: 13.7    2944-0595 [employer].   Care instructions adapted under license by your healthcare professional. If you have questions about a medical condition or this instruction, always ask your healthcare professional. [employer] disclaims any warranty or liability for your use of this information.      Learning About Domestic Abuse  What is domestic abuse?  Domestic abuse is threats or violent behavior in a personal relationship. It can happen between past or current partners or spouses. It's also called domestic violence or intimate partner violence.  Domestic abuse can affect people of any ethnic group, race, or Zoroastrian. It can affect teens, adults, or the elderly. And it can happen to people of any sexual identity or social status. But most abuse victims are women.  Abusers use fear, bullying, and threats to control their partners. They control what their partners do, where they go, or who they see. They may act jealous, controlling, or " possessive. These early signs of abuse may happen soon after the start of the relationship. Sometimes it can be hard to notice abuse at first. But after the relationship becomes more serious, the abuse may get worse.  If you are being abused in your relationship, it's important to get help. The abuse is not your fault, and you don't have to face it alone.  Be careful  It may not be safe to take home domestic abuse information like this handout. Some people ask a trusted friend to keep it for them. It's also important to plan ahead and to memorize the phone number of places you can go for help. If you are concerned about your safety, do not use your computer, smartphone, or tablet to read about domestic abuse.   What are the types of domestic abuse?  Abuse can be emotional, physical, or sexual.  Emotional abuse  Emotional abuse is a pattern of threats, insults, or controlling behavior. It includes verbal abuse. It goes beyond healthy disagreements in a relationship. It's a sign of an unhealthy relationship, and it may be against the law.  Do you feel threatened, intimidated, or controlled? Does your partner threaten your children, other family members, or pets?  Does your partner:  Use jokes meant to embarrass or shame you?  Call you names?  Tell you that you are a bad parent or threaten to take away your children?  Threaten to have you or your family members deported?  Control your access to money or other basic needs?  Control what you do, who you see or talk to, or where you go?  Another form of emotional abuse is denying that it is happening. Or the abuser may act like the abuse is no big deal or is your fault.  Sexual abuse  With sexual abuse, abusers may try to convince or force you to have sex. They may force you into sex acts you're not comfortable with. Or they may sexually assault you. Sexual abuse can happen even if you are in a committed relationship.  Physical abuse  Physical abuse means that a partner  hits, kicks, or physically hurts you. Physical abuse that starts with a slap might lead to kicking, shoving, and choking over time. The abuser may also threaten to hurt or kill you.  What problems can domestic abuse lead to?  Domestic abuse can be very dangerous. It can cause serious, repeated injury. It can even lead to death.  All forms of abuse can cause long-term health problems from the stress of a violent relationship. Verbal abuse can lead to sexual and physical abuse.  Abuse causes emotional pain, depression, anxiety, and post-traumatic stress. Sexual abuse can lead to sexually transmitted infections (including HIV/AIDS) and unplanned pregnancy.  Pregnancy can be a very dangerous time for people in abusive relationships. Pregnant people who are abused may have anemia, infections, bleeding, or poor weight gain. Abuse during this time may also increase your baby's risk of low birth weight, premature birth, and death.  It can be hard for some victims of domestic abuse to ask for help or to leave their relationship. You may feel scared, stuck, or not sure what steps to take. But it's important not to ignore abuse. Talking to someone could be the first step to ending the abuse and taking care of your own health and happiness again.  Where can you get help?  Talk to a trusted friend. Find a local advocacy group, or talk to your doctor about the abuse.  Contact the National Domestic Violence Hotline at 5-708-095-WYQP (1-464.512.2458) for more safety tips. They can guide you to groups in your area that can help. Or go to the National Coalition Against Domestic Violence website at www.thehotline.org to learn more.  Domestic violence groups or a counselor in your area can help you make a safety plan for yourself and your children.  When to call for help  Call 911 anytime you think you may need emergency care. For example, call if:  You think that you or someone you know is in danger of being abused.  You have been  "hurt and can't have someone safely take you to emergency care.  You have just been abused.  A family member has just been abused.  Where can you learn more?  Go to https://www.Voxxter.net/patiented  Enter S665 in the search box to learn more about \"Learning About Domestic Abuse.\"  Current as of: February 27, 2023               Content Version: 13.7    9628-2090 [employer].   Care instructions adapted under license by your healthcare professional. If you have questions about a medical condition or this instruction, always ask your healthcare professional. [employer] disclaims any warranty or liability for your use of this information.      Vaginal Bleeding During Pregnancy: Care Instructions  Overview     It's common to have some vaginal spotting when you are pregnant. In some cases, the bleeding isn't serious. And there aren't any more problems with the pregnancy.  But sometimes bleeding is a sign of a more serious problem. This is more common if the bleeding is heavy or painful. Examples of more serious problems include miscarriage, an ectopic pregnancy, and a problem with the placenta.  You may have to see your doctor again to be sure everything is okay. You may also need more tests to find the cause of the bleeding.  Home treatment may be all you need. But it depends on what is causing the bleeding. Be sure to tell your doctor if you have any new symptoms or if your symptoms get worse.  The doctor has checked you carefully, but problems can develop later. If you notice any problems or new symptoms, get medical treatment right away.  Follow-up care is a key part of your treatment and safety. Be sure to make and go to all appointments, and call your doctor if you are having problems. It's also a good idea to know your test results and keep a list of the medicines you take.  How can you care for yourself at home?  If your doctor prescribed medicines, take them exactly as directed. Call " "your doctor if you think you are having a problem with your medicine.  Do not have vaginal sex until your doctor says it's okay.  Do not put anything in your vagina until your doctor says it's okay.  Ask your doctor about other activities you can or can't do.  Get a lot of rest. Being pregnant can make you tired.  Do not use nonsteroidal anti-inflammatory drugs (NSAIDs), such as ibuprofen (Advil, Motrin), naproxen (Aleve), or aspirin, unless your doctor says it is okay.  When should you call for help?   Call 911 anytime you think you may need emergency care. For example, call if:    You passed out (lost consciousness).     You have severe vaginal bleeding. This means you are soaking through a pad each hour for 2 or more hours.     You have sudden, severe pain in your belly or pelvis.   Call your doctor now or seek immediate medical care if:    You have new or worse vaginal bleeding.     You are dizzy or lightheaded, or you feel like you may faint.     You have pain in your belly, pelvis, or lower back.     You think that you are in labor.     You have a sudden release of fluid from your vagina.     You've been having regular contractions for an hour. This means that you've had at least 8 contractions within 1 hour or at least 4 contractions within 20 minutes, even after you change your position and drink fluids.     You notice that your baby has stopped moving or is moving much less than normal.   Watch closely for changes in your health, and be sure to contact your doctor if you have any problems.  Where can you learn more?  Go to https://www.Insightpool.net/patiented  Enter N829 in the search box to learn more about \"Vaginal Bleeding During Pregnancy: Care Instructions.\"  Current as of: November 9, 2022               Content Version: 13.7    7111-7269 [employer].   Care instructions adapted under license by your healthcare professional. If you have questions about a medical condition or this " instruction, always ask your healthcare professional. [employer], [employer] disclaims any warranty or liability for your use of this information.      Weeks 10 to 14 of Your Pregnancy: Care Instructions  It's now possible to hear the fetus's heartbeat with a special ultrasound device. And the fetus's organs are developing.    Decide about tests to check for birth defects. Think about your age, your chance of passing on a family disease, your need to know about any problems, and what you might do after you have the test results.   It's okay to exercise. Try activities such as walking or swimming. Check with your doctor before starting a new program.     You may feel more tired than usual.  Taking naps during the day may help.     You may feel emotional.  It might help to talk to someone.     You may have headaches.  Try lying down and putting a cool cloth over your forehead.     You can use acetaminophen (Tylenol) for pain relief.  Don't take any anti-inflammatory medicines (such as Advil, Motrin, Aleve), unless your doctor says it's okay.     You may feel a fullness or aching in your lower belly.  This can feel like the kind of cramps you might get before a period. A back rub may help.     You may need to urinate more.  Your growing uterus and changing hormones can affect your bladder.     You may feel sick to your stomach (morning sickness).  Try avoiding food and smells that make you feel sick.     Your breasts may feel different.  They may feel tender or get bigger. Your nipples may get darker. Try a bra that gives you good support.     Avoid alcohol, tobacco, and drugs (including marijuana).  If you need help quitting, talk to your doctor.     Take a daily prenatal vitamin.  Choose one with folic acid.   Follow-up care is a key part of your treatment and safety. Be sure to make and go to all appointments, and call your doctor if you are having problems. It's also a good idea to know your test results and keep  "a list of the medicines you take.  Where can you learn more?  Go to https://www.[place].net/patiented  Enter E090 in the search box to learn more about \"Weeks 10 to 14 of Your Pregnancy: Care Instructions.\"  Current as of: November 9, 2022               Content Version: 13.7    9913-7002 [employer].   Care instructions adapted under license by your healthcare professional. If you have questions about a medical condition or this instruction, always ask your healthcare professional. [employer] disclaims any warranty or liability for your use of this information.      Understanding Alpha and Beta Thalassemia  What is thalassemia?  Thalassemia [erasmo-ivory-SEE-megan-uh] is a lifelong blood disorder. It is caused by mutations (changes) in the genes that make hemoglobin.   Hemoglobin is a protein in red blood cells that carries oxygen. Hemoglobin is made of 4 smaller protein chains: 2 blocks of alpha chains and 2 blocks of beta chains (see Figure 1). Each block has heme (iron) in the center. Oxygen sticks to the heme, allowing your body to carry it through the bloodstream. The oxygen is delivered to your whole body.  When you have alpha thalassemia, your alpha blocks are smaller or are missing one or both alpha chains. (See Figure 2 for an example.) For beta thalassemia, the beta blocks are smaller or fewer in number.   With either disorder, your body makes smaller hemoglobin and possibly fewer red blood cells to hold hemoglobin (anemia). You may feel very tired or have other problems as a result.  How do you get thalassemia?  There are 4 genes for alpha thalassemia and 2 genes for beta thalassemia. For you to have either alpha or beta thalassemia, both of your parents must carry a gene mutation for the disease and pass it down to you.   It's possible to have more severe or less severe symptoms than your parents. If you get a mutation from only one parent, for example, you won't have symptoms of " thalassemia (thalassemia trait)--but you could still pass the mutation to your children.  Types of thalassemias  Some types of thalassemia have fewer symptoms than others. How severe the thalassemia is depends on how many mutated genes you have inherited and how many alpha or beta blocks are affected.   Alpha Thalassemia  Silent carrier (1 alpha mutation): People with this type have no symptoms and often do not know they have thalassemia.  Alpha thalassemia trait (2 alpha mutations): Some people with this trait may have mild anemia, but they often feel well.  Hemoglobin H disease (3 alpha mutations): People with this disorder have anemia more often. They sometimes need blood transfusions, but can have a normal life span.  Hemoglobin Barts (4 alpha mutations):  With this type, no alpha blocks are made. Severe problems occur during the mother's pregnancy and newborns rarely survive.  Beta Thalassemia  Beta thalassemia trait (1 beta mutation): People with this trait (also called beta thalassemia minor) have red blood cells that are small. Mild anemia can occur, but it is rare.  Beta thalassemia intermedia (2 beta mutations): In this type, both beta genes are abnormal, but these mutations may be mild. More severe types sometimes need blood transfusions to treat anemia and medicine to treat iron buildup. Patients have normal life spans.  Beta thalassemia major (2 severe beta mutations): This is the most severe form of beta thalassemia. Both beta genes are abnormal, causing little to no beta blocks to be made. Patients often need medicine to reduce iron buildup, as well as monthly blood transfusions to stay healthy. The only cure at this time is a bone marrow transplant. More options are still being studied.  Treatment and outcomes  Mild types: People with a mild type of alpha or beta thalassemia rarely need treatment. Some need folic acid to help make more red blood cells. Most have normal life spans.  Moderate to severe  types: Patients with these types have a higher risk for reduced growth, early bone thinning and pregnancy problems.  Most severe types: These patients often need regular blood transfusions, even if not sick. It is common to have iron build up in your body, so medicine may be needed to reduce the buildup. If left untreated, too much iron, especially in the liver and heart, can lead to premature death.  Outcomes for patients with alpha or beta thalassemia are usually very good in developed countries like the [place]. Survival rates higher than 90% have been reported for children.   For informational purposes only. Not to replace the advice of your health care provider. Copyright   2021 [facility]. All rights reserved. Clinically reviewed by Javier Bynum MD, Hematology. [employer] [id number] - REV 08/21.    Nutrition During Pregnancy: Care Instructions  Overview     Healthy eating when you are pregnant is important for you and your baby. It can help you feel well and have a successful pregnancy and delivery. During pregnancy your nutrition needs increase. Even if you have excellent eating habits, your doctor may recommend a multivitamin to make sure you get enough iron and folic acid.  You may wonder how much weight you should gain. In general, if you were at a healthy weight before you became pregnant, then you should gain between 25 and 35 pounds. If you were overweight before pregnancy, then you'll likely be advised to gain 15 to 25 pounds. If you were underweight before pregnancy, then you'll probably be advised to gain 28 to 40 pounds. Your doctor will work with you to set a weight goal that is right for you. Gaining a healthy amount of weight helps you have a healthy baby.  Follow-up care is a key part of your treatment and safety. Be sure to make and go to all appointments, and call your doctor if you are having problems. It's also a good idea to know your test results and keep a list of  the medicines you take.  How can you care for yourself at home?  Eat plenty of fruits and vegetables. Include a variety of orange, yellow, and leafy dark-green vegetables every day.  Choose whole-grain bread, cereal, and pasta. Good choices include whole wheat bread, whole wheat pasta, brown rice, and oatmeal.  Get 4 or more servings of milk and milk products each day. Good choices include nonfat or low-fat milk, yogurt, and cheese. If you cannot eat milk products, you can get calcium from calcium-fortified products such as orange juice, soy milk, and tofu. Other non-milk sources of calcium include leafy green vegetables, such as broccoli, kale, mustard greens, turnip greens, bok tena, and brussels sprouts.  If you eat meat, pick lower-fat types. Good choices include lean cuts of meat and chicken or turkey without the skin.  Do not eat shark, swordfish, luz maria mackerel, or tilefish. They have high levels of mercury, which is dangerous to your baby. You can eat up to 12 ounces a week of fish or shellfish that have low mercury levels. Good choices include shrimp, wild salmon, pollock, and catfish. Limit some other types of fish, such as white (albacore) tuna, to 4 oz (0.1 kg) a week.  Heat lunch meats (such as turkey, ham, or bologna) to 165 F before you eat them. This reduces your risk of getting sick from a kind of bacteria that can be found in lunch meats.  Do not eat unpasteurized soft cheeses, such as brie, feta, fresh mozzarella, and blue cheese. They have a bacteria that could harm your baby.  Limit caffeine. If you drink coffee or tea, have no more than 1 cup a day. Caffeine is also found in patricia.  Do not drink any alcohol. No amount of alcohol has been found to be safe during pregnancy.  Do not diet or try to lose weight. For example, do not follow a low-carbohydrate diet. If you are overweight at the start of your pregnancy, your doctor will work with you to manage your weight gain.  Tell your doctor about  "all vitamins and supplements you take.  When should you call for help?  Watch closely for changes in your health, and be sure to contact your doctor if you have any problems.  Where can you learn more?  Go to https://www.[employer].net/patiented  Enter Y785 in the search box to learn more about \"Nutrition During Pregnancy: Care Instructions.\"  Current as of: March 1, 2023               Content Version: 13.7    5527-5319 [employer].   Care instructions adapted under license by your healthcare professional. If you have questions about a medical condition or this instruction, always ask your healthcare professional. [employer] disclaims any warranty or liability for your use of this information.      Exercise During Pregnancy: Care Instructions  Your Care Instructions     Exercise is good for healthy pregnant women. It can relieve back pain, swelling, and other discomforts of pregnancy. It also prepares your muscles for childbirth. And exercise can improve your energy level and help you sleep better.  If your doctor recommends it, get more exercise. Walking is a good choice. Bit by bit, increase the amount you walk every day. Try for at least 30 minutes on most days of the week. But if you do not already exercise, be sure to talk with your doctor before you start a new exercise program. Try exercise classes for pregnant women. Doctors do not recommend contact sports during pregnancy.  Follow-up care is a key part of your treatment and safety. Be sure to make and go to all appointments, and call your doctor if you are having problems. It's also a good idea to know your test results and keep a list of the medicines you take.  How can you care for yourself at home?  Talk with your doctor about the right kind of exercise for each stage of pregnancy.  Listen to your body to know if your exercise is at a safe level.  Do not become overheated while you exercise. High body temperature can be harmful " "to your baby. Avoid activities that can make your body too hot.  If you feel tired, take it easy. You might walk instead of run.  If you are used to strenuous exercise, pay attention to changes in your body that mean it is time to slow down.  If you exercised before getting pregnant, you should be able to keep up your routine early in your pregnancy. That might include running and aerobics. Later, you may want to switch to swimming or walking.  Eat a small snack or drink juice 15 to 30 minutes before you exercise.  Eat a healthy diet. Make sure it includes plenty of beans, peas, and leafy green vegetables. You may need to increase how much you eat to get extra energy for exercise.  Drink plenty of fluids before, during, and after exercise.  Avoid contact sports, such as soccer and basketball. Also avoid scuba diving, exercise in high altitude (above 6,000 feet), and horseback riding.  Do not get overtired while you exercise. You should be able to talk while you work out.  After your fourth month of pregnancy, avoid exercises (such as sit-ups and some yoga poses) that require you to lie flat on your back on a hard surface.  Try swimming and brisk walking during all your pregnancy.  Get plenty of rest. You may be very tired while you are pregnant.  Where can you learn more?  Go to https://www.OkCopay.net/patiented  Enter S801 in the search box to learn more about \"Exercise During Pregnancy: Care Instructions.\"  Current as of: November 9, 2022               Content Version: 13.7    0568-9470 [employer].   Care instructions adapted under license by your healthcare professional. If you have questions about a medical condition or this instruction, always ask your healthcare professional. [employer] disclaims any warranty or liability for your use of this information.      Learning About Pregnancy and Obesity  How does your weight affect your pregnancy?     The basics of prenatal care are the " "same for everyone, regardless of size. You'll get what you need to have a healthy baby.  But your size can make a difference in a few things. You and your doctor will have to watch your pregnancy weight. Your weight may affect your labor and delivery.  You may have some extra doctor visits and tests. And you may have some tests earlier in your pregnancy. You'll need to pay close attention to things like blood pressure and the chance of getting gestational diabetes. (This is a type of diabetes that sometimes happens during pregnancy.) And close attention will be given to your developing baby.  Work with your doctor to get the care you need. Go to all your doctor visits, and follow your doctor's advice about what to do and what to avoid during pregnancy.  How much weight gain is healthy?  If you are very overweight (obese), experts recommend that you gain between 11 and 20 pounds. Your doctor will work with you to set a weight goal that's right for you. In some cases, your doctor may recommend that you not gain any weight.  How much extra food do you need to eat?  Although you may joke that you're \"eating for two\" during pregnancy, you don't need to eat twice as much food. How much you can eat depends on:  Your height.  How much you weigh when you get pregnant.  How active you are.  How many babies you're carrying.  In the first trimester, you'll probably need the same amount of calories as you did before you were pregnant. In general, in your second trimester, you need to eat about 340 extra calories a day. In your third trimester, you need to eat about 450 extra calories a day.  You can get about 340 calories in a peanut butter sandwich. Having a cup of 1% milk with a peanut butter sandwich is about 450 calories.  What can you do to have a healthy pregnancy?  The best things you can do for you and your baby are to eat healthy foods, get regular exercise, avoid alcohol and smoking, and go to your doctor visits.  Eat " "a variety of foods from all the food groups. Make sure to get enough calcium and folic acid.  You may want to work with a dietitian to help you plan healthy meals to get the right amount of calories for you.  If you didn't exercise much before you got pregnant, talk to your doctor about how you can slowly get more active. Your doctor may want to set up an exercise program with you.  Where can you learn more?  Go to https://www.textPlus.net/patiented  Enter B644 in the search box to learn more about \"Learning About Pregnancy and Obesity.\"  Current as of: November 9, 2022               Content Version: 13.7    7235-6771 [employer].   Care instructions adapted under license by your healthcare professional. If you have questions about a medical condition or this instruction, always ask your healthcare professional. [employer] disclaims any warranty or liability for your use of this information.      You have been provided the CDC Warning Signs in Pregnancy document.    Additional copies can be found here: www.ZarthCode.com/308870.pdf  "

## 2023-10-10 PROBLEM — N89.8 VAGINAL IRRITATION: Status: RESOLVED | Noted: 2023-08-30 | Resolved: 2023-10-10

## 2023-10-10 PROBLEM — B37.31 YEAST INFECTION OF THE VAGINA: Status: RESOLVED | Noted: 2023-08-31 | Resolved: 2023-10-10

## 2023-10-11 DIAGNOSIS — Z34.90 SUPERVISION OF NORMAL PREGNANCY: Primary | ICD-10-CM

## 2023-10-12 LAB
ABO/RH(D): NORMAL
ANTIBODY SCREEN: NEGATIVE
SPECIMEN EXPIRATION DATE: NORMAL

## 2023-10-13 ENCOUNTER — ANCILLARY PROCEDURE (OUTPATIENT)
Dept: ULTRASOUND IMAGING | Facility: CLINIC | Age: 33
End: 2023-10-13
Attending: REGISTERED NURSE
Payer: COMMERCIAL

## 2023-10-13 ENCOUNTER — TELEPHONE (OUTPATIENT)
Dept: OBGYN | Facility: CLINIC | Age: 33
End: 2023-10-13

## 2023-10-13 ENCOUNTER — PRENATAL OFFICE VISIT (OUTPATIENT)
Dept: OBGYN | Facility: CLINIC | Age: 33
End: 2023-10-13
Attending: REGISTERED NURSE
Payer: COMMERCIAL

## 2023-10-13 ENCOUNTER — LAB (OUTPATIENT)
Dept: LAB | Facility: CLINIC | Age: 33
End: 2023-10-13
Attending: REGISTERED NURSE
Payer: COMMERCIAL

## 2023-10-13 ENCOUNTER — TRANSCRIBE ORDERS (OUTPATIENT)
Dept: MATERNAL FETAL MEDICINE | Facility: CLINIC | Age: 33
End: 2023-10-13

## 2023-10-13 VITALS
HEART RATE: 76 BPM | HEIGHT: 69 IN | WEIGHT: 155 LBS | BODY MASS INDEX: 22.96 KG/M2 | SYSTOLIC BLOOD PRESSURE: 138 MMHG | DIASTOLIC BLOOD PRESSURE: 78 MMHG

## 2023-10-13 DIAGNOSIS — Z34.81 ENCOUNTER FOR SUPERVISION OF OTHER NORMAL PREGNANCY, FIRST TRIMESTER: Primary | ICD-10-CM

## 2023-10-13 DIAGNOSIS — N63.21 MASS OF UPPER OUTER QUADRANT OF LEFT BREAST: ICD-10-CM

## 2023-10-13 DIAGNOSIS — N63.21 MASS OF UPPER OUTER QUADRANT OF LEFT BREAST: Primary | ICD-10-CM

## 2023-10-13 DIAGNOSIS — O26.90 PREGNANCY RELATED CONDITION, ANTEPARTUM: Primary | ICD-10-CM

## 2023-10-13 DIAGNOSIS — Z34.81 ENCOUNTER FOR SUPERVISION OF OTHER NORMAL PREGNANCY, FIRST TRIMESTER: ICD-10-CM

## 2023-10-13 DIAGNOSIS — Z34.90 SUPERVISION OF NORMAL PREGNANCY: ICD-10-CM

## 2023-10-13 LAB
ERYTHROCYTE [DISTWIDTH] IN BLOOD BY AUTOMATED COUNT: 12.3 % (ref 10–15)
HBA1C MFR BLD: 4.6 %
HBV SURFACE AB SERPL IA-ACNC: 1.27 M[IU]/ML
HBV SURFACE AB SERPL IA-ACNC: NONREACTIVE M[IU]/ML
HBV SURFACE AG SERPL QL IA: NONREACTIVE
HCT VFR BLD AUTO: 42.4 % (ref 35–47)
HCV AB SERPL QL IA: NONREACTIVE
HGB BLD-MCNC: 15.1 G/DL (ref 11.7–15.7)
HIV 1+2 AB+HIV1 P24 AG SERPL QL IA: NONREACTIVE
MCH RBC QN AUTO: 30.6 PG (ref 26.5–33)
MCHC RBC AUTO-ENTMCNC: 35.6 G/DL (ref 31.5–36.5)
MCV RBC AUTO: 86 FL (ref 78–100)
PLATELET # BLD AUTO: 262 10E3/UL (ref 150–450)
RBC # BLD AUTO: 4.93 10E6/UL (ref 3.8–5.2)
RUBV IGG SERPL QL IA: 1.36 INDEX
RUBV IGG SERPL QL IA: POSITIVE
T PALLIDUM AB SER QL: NONREACTIVE
VIT D+METAB SERPL-MCNC: 43 NG/ML (ref 20–50)
VZV IGG SER QL IA: 1195 INDEX
VZV IGG SER QL IA: POSITIVE
WBC # BLD AUTO: 11.5 10E3/UL (ref 4–11)

## 2023-10-13 PROCEDURE — 82306 VITAMIN D 25 HYDROXY: CPT

## 2023-10-13 PROCEDURE — 86762 RUBELLA ANTIBODY: CPT

## 2023-10-13 PROCEDURE — 76817 TRANSVAGINAL US OBSTETRIC: CPT

## 2023-10-13 PROCEDURE — 85027 COMPLETE CBC AUTOMATED: CPT

## 2023-10-13 PROCEDURE — 87086 URINE CULTURE/COLONY COUNT: CPT | Performed by: REGISTERED NURSE

## 2023-10-13 PROCEDURE — 86481 TB AG RESPONSE T-CELL SUSP: CPT

## 2023-10-13 PROCEDURE — 99213 OFFICE O/P EST LOW 20 MIN: CPT | Performed by: REGISTERED NURSE

## 2023-10-13 PROCEDURE — 90686 IIV4 VACC NO PRSV 0.5 ML IM: CPT

## 2023-10-13 PROCEDURE — 86787 VARICELLA-ZOSTER ANTIBODY: CPT

## 2023-10-13 PROCEDURE — 86706 HEP B SURFACE ANTIBODY: CPT

## 2023-10-13 PROCEDURE — 87340 HEPATITIS B SURFACE AG IA: CPT

## 2023-10-13 PROCEDURE — G0008 ADMIN INFLUENZA VIRUS VAC: HCPCS

## 2023-10-13 PROCEDURE — 36415 COLL VENOUS BLD VENIPUNCTURE: CPT

## 2023-10-13 PROCEDURE — 99207 PR PRENATAL VISIT: CPT | Performed by: REGISTERED NURSE

## 2023-10-13 PROCEDURE — 250N000011 HC RX IP 250 OP 636

## 2023-10-13 PROCEDURE — 86780 TREPONEMA PALLIDUM: CPT

## 2023-10-13 PROCEDURE — 87389 HIV-1 AG W/HIV-1&-2 AB AG IA: CPT

## 2023-10-13 PROCEDURE — 76817 TRANSVAGINAL US OBSTETRIC: CPT | Mod: 26 | Performed by: STUDENT IN AN ORGANIZED HEALTH CARE EDUCATION/TRAINING PROGRAM

## 2023-10-13 PROCEDURE — 86901 BLOOD TYPING SEROLOGIC RH(D): CPT | Performed by: REGISTERED NURSE

## 2023-10-13 PROCEDURE — 86803 HEPATITIS C AB TEST: CPT

## 2023-10-13 PROCEDURE — 83036 HEMOGLOBIN GLYCOSYLATED A1C: CPT

## 2023-10-13 ASSESSMENT — PAIN SCALES - GENERAL: PAINLEVEL: NO PAIN (0)

## 2023-10-13 NOTE — PROGRESS NOTES
S Provider New OB Note    Reviewed RN intake note.    River is a 32 year old female who presents to clinic for a new OB visit.   at 8w0d with Estimated Date of Delivery: May 24, 2024 based on LMP c/w US. Feels well. Has started PNV.   She has not had bleeding since her LMP.   She has had mild nausea. Weight loss has not occurred. Able to eat and drink   This was not a planned pregnancy, but welcomed.  Partner is involved,  Alex (legal name Franklin)   OTHER CONCERNS: none.    Obstetric history:   Hx  x1 2021, 2nd degree perineal laceration. 1st degree prolapse. Delivered in triage in [place], [place]. It was during the pandemic and she shared that although the nurses were supportive, the environment was not. She strongly desires space, minimal interruption and low intervention for this birth.     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 Moved back to [place] in  [place].   - Since achieving pregnancy, yeast discomfort has significantly reduced. Feeling much better. Will let us know if she has worsening symptoms as the plan created with Kandice is not possible with concurrent pregnancy.   - Hx HSV , has tested negative for HSV 1, 2 on 23    PSYCHIATRIC:  Denies depression or anxiety diagnoses. Does not some anxiety but managed well with diet, sleep and exercise.    PHQ-9 score:        2023     8:30 AM   PHQ-9 SCORE   PHQ-9 Total Score 2         2023     8:30 AM   KATHARINA-7 SCORE   Total Score 2       Past History:  Her past medical history   Past Medical History:   Diagnosis Date    Herpes simplex virus (HSV) infection     Known health problems: none     Pelvic floor dysfunction     Uncomplicated asthma 2003    Urinary incontinence    .   Her past pregnancies have been uncomplicated  Since her last LMP she denies use of alcohol, tobacco and street drugs.  HISTORY:  Family History   Problem Relation Age of Onset    Hypertension Mother     Thyroid Disease Mother      Polycystic ovary syndrome Mother     Osteoporosis Mother     Skin Cancer Father     Asthma Father     Allergies Father     Eczema Father     Breast Cancer Maternal Grandmother 70 - 79        in remission    Cerebral aneurysm Maternal Grandfather     Hyperlipidemia Maternal Grandfather     Hypertension Maternal Grandfather     Breast Cancer Paternal Grandmother 70 - 79    Lymphoma Paternal Grandmother     Skin Cancer Paternal Grandmother     Chronic Obstructive Pulmonary Disease Paternal Grandfather         asbestos exposure    Cerebrovascular Disease Paternal Grandfather     Alcoholism Paternal Grandfather     Melanoma Sister     Eczema Sister     No Known Problems Son     Colon Cancer No family hx of      Social History     Socioeconomic History    Marital status:      Spouse name: None    Number of children: None    Years of education: None    Highest education level: None   Tobacco Use    Smoking status: Never     Passive exposure: Never    Smokeless tobacco: Never   Vaping Use    Vaping Use: Never used   Substance and Sexual Activity    Alcohol use: Not Currently     Comment: none for 2 years-no interest since last child    Drug use: Never    Sexual activity: Yes     Partners: Male     Current Outpatient Medications   Medication Sig    lactobacillus rhamnosus, GG, (CULTURELL) capsule Take 1 capsule by mouth 2 times daily    omega 3 1000 MG CAPS Take 1 capful. by mouth daily    Prenatal Vit-Fe Fumarate-FA (PRENATAL MULTIVITAMIN W/IRON) 27-0.8 MG tablet Take 1 tablet by mouth daily    acyclovir (ZOVIRAX) 400 MG tablet Take 1 tablet (400 mg) by mouth 2 times daily (Patient not taking: Reported on 10/13/2023)    terconazole (TERAZOL 7) 0.4 % vaginal cream Place 1 applicator vaginally At Bedtime (Patient not taking: Reported on 10/13/2023)     No current facility-administered medications for this visit.     No Known Allergies    ============================================  MEDICAL HISTORY  Past Medical  "History:   Diagnosis Date    Herpes simplex virus (HSV) infection     Known health problems: none     Pelvic floor dysfunction     Uncomplicated asthma     Urinary incontinence      Past Surgical History:   Procedure Laterality Date    NO HISTORY OF SURGERY      WISDOM TOOTH EXTRACTION      woke from anesthesia with some hallucinations.  recovered quickly and home same day       OB History    Para Term  AB Living   2 1 1 0 0 1   SAB IAB Ectopic Multiple Live Births   0 0 0 0 1      # Outcome Date GA Lbr Jackson/2nd Weight Sex Delivery Anes PTL Lv   2 Current            1 Term 21 40w3d  3.969 kg (8 lb 12 oz) M Vag-Spont Local N SURAJ      Name: ALEKSANDR,MALE      Apgar1: 6  Apgar5: 9      Obstetric Comments   NONE-first child born during pandemic.  River reports this was traumatic experience for her          Reviewed genetic history form       GYN History- Remote hx of Abnormal Pap , biopsies were normal. All normal since then.                         Cervical procedures: none                        History of STI: +HPV unknown.   Last pap 8/10/2020 NILM, HPV negative, due     I personally reviewed the past social/family/medical and surgical history on the date of service.     OBJECTIVE:  /78   Pulse 76   Ht 1.746 m (5' 8.74\")   Wt 70.3 kg (155 lb)   LMP 2023 (Exact Date)   BMI 23.06 kg/m    ROS: 10 point ROS neg other than the symptoms noted above in the HPI.    EXAM:  /78   Pulse 76   Ht 1.746 m (5' 8.74\")   Wt 70.3 kg (155 lb)   LMP 2023 (Exact Date)   BMI 23.06 kg/m     EXAM:  GENERAL:  Pleasant pregnant female, alert, cooperative and well groomed.  SKIN:  Warm and dry, without lesions or rashes  HEAD: Symmetrical features.  MOUTH:  Buccal mucosa pink, moist without lesions.  Teeth in good repair.    NECK:  Thyroid without enlargement and nodules.  Lymph nodes not palpable.   LUNGS:  Clear to auscultation.  BREAST:    1cm x 1cm, firm, " mobile lump, upper outer quadrant of left breast. No skin or nipple changes or axillary nodes.   Nipples everted. Noticed by patient just prior to pregnancy.   HEART:  RRR without murmur.  MUSCULOSKELETAL:  Full range of motion  EXTREMITIES:  No edema. No significant varicosities.   PELVIC EXAM: deferred. Recently completed.       Recommended Flu Vaccine.  Flu Vaccine Given      ASSESSMENT:  32 year old , 8w0d weeks of pregnancy with PATRICK of May 24, 2024 by LMP c/w US.   Intrauterine pregnancy 8w0d size consistent with dates  Genetic Screening: First Trimester Screen    ICD-10-CM    1. Encounter for supervision of other normal pregnancy, first trimester  Z34.81 Hepatitis B Surface Antibody     Hemoglobin A1c     Rubella Antibody IgG     Varicella Zoster Virus Antibody IgG     Urine Culture     Hepatitis C antibody     ABO/Rh type and screen     CBC with platelets     Hepatitis B surface antigen     HIV Antigen Antibody Combo Cascade     Treponema Abs w Reflex to RPR and Titer     25- OH-Vitamin D     Mat Fetal Med Ctr Referral - Pregnancy     Quantiferon-TB Gold Plus      2. Mass of upper outer quadrant of left breast  N63.21 US Breast Left Limited 1-3 Quadrants          River was seen today for prenatal care.    Diagnoses and all orders for this visit:    Encounter for supervision of other normal pregnancy, first trimester  -     Hepatitis B Surface Antibody; Future  -     Hemoglobin A1c; Future  -     Rubella Antibody IgG; Future  -     Varicella Zoster Virus Antibody IgG; Future  -     Urine Culture  -     Hepatitis C antibody; Future  -     ABO/Rh type and screen  -     CBC with platelets; Future  -     Hepatitis B surface antigen; Future  -     HIV Antigen Antibody Combo Cascade; Future  -     Treponema Abs w Reflex to RPR and Titer; Future  -     25- OH-Vitamin D; Future  -     Mat Fetal Med Ctr Referral - Pregnancy; Future  -     Quantiferon-TB Gold Plus; Future    Mass of upper outer quadrant of left  breast  -     US Breast Left Limited 1-3 Quadrants; Future    Other orders  -     INFLUENZA VACCINE >6 MONTHS (AFLURIA/FLUZONE)          Orders Placed This Encounter   Procedures    US Breast Left Limited 1-3 Quadrants    INFLUENZA VACCINE >6 MONTHS (AFLURIA/FLUZONE)    Hepatitis B Surface Antibody    Hemoglobin A1c    Rubella Antibody IgG    Varicella Zoster Virus Antibody IgG    Hepatitis C antibody    CBC with platelets    Hepatitis B surface antigen    HIV Antigen Antibody Combo Cascade    Treponema Abs w Reflex to RPR and Titer    25- OH-Vitamin D    Mat Fetal Med Ctr Referral - Pregnancy    Adult Type and Screen    ABO/Rh type and screen    Quantiferon-TB Gold Plus        PLAN:  - Reviewed use of triage nurse line and contacting the on-call provider after hours for an urgent need such as fever, vagina bleeding, bladder or vaginal infection, rupture of membranes,  or term labor.    -Ordered routine prenatal lab work.     - Reviewed best evidence for: weight gain for her weight and height for pregnancy:  Based on pre-pregnancy Body mass index is 23.06 kg/m . RECOMMENDED WEIGHT GAIN: 25-35 lbs.      - Reviewed healthy diet and foods to avoid, exercise and activity during pregnancy; avoiding exposure to toxoplasmosis; and maintenance of a generally healthy lifestyle.   - Discussed the harms, benefits, side effects and alternative therapies for current prescribed and OTC medications. Patient was encouraged to start/continue prenatal vitamins as tolerated.    - Oriented to Practice, types of care, and how to reach a provider.  Pt prefers CNM team.     - Patient received 1st trimester new OB education packet complete with aide of The Expectant Family booklet including information on genetic screening test options.  - Patient desires 1st trimester screening and desires level II ultrasound which was ordered.    - Reviewed risk for gestational diabetes d/t No known risk factors for GDM, IS NOT agreeable to early  1 hour today.    - Reviewed risk for Pre Eclampsia d/t No known risk factors of High risk for Pre E or meets two or more of the moderate risk factors including Age =35 years or older  and IS NOTagreeable to starting 81mg aspirin daily after 12 weeks .    - The patient  does not have a history of spontaneous  birth so  WILL NOT consider serial transvaginal cervical length ultrasounds from 16-24 weeks.     -The patient does not have a history of immunosuppresion or HIV so Toxoplasma IgG/IgM WILL NOT be ordered.    -Assess risk for asymptomatic latent TB (prior infection, recent immigrant from epidemic areas, immunosuppression, living in overcrowded environment):   WILL NOT have PPD skin test or Quantiferon-TB Gold Plus blood draw. *both options valid* Has been tested previously given time living abroad and was negative. Had friend whom she traveled with test positive for latent TB and she was not tested afterward. They lived in close quarters abroad. She accepts testing today with [facility].     - US ordered for left breast lump. Follow up based on results.     - All pt's questions discussed and answered.  Pt verbalized understanding of and agreement to plan of care.     - Continue scheduled prenatal care and prn if questions or concerns  - RTC 1 month or sooner prn.     BRYON Hayden CNM

## 2023-10-13 NOTE — TELEPHONE ENCOUNTER
Order placed per clinic protocol. Called pt and left VM with breast center scheduling number. Let her know that both the US and MA were ordered and she can schedule now.    Patient Education        Wheezing or Bronchoconstriction: Care Instructions  Your Care Instructions  Wheezing is a whistling noise made during breathing. It occurs when the small airways, or bronchial tubes, that lead to your lungs swell or contract (spasm) and become narrow. This narrowing is called bronchoconstriction. When your airways constrict, it is hard for air to pass through and this makes it hardfor you to breathe. Wheezing and bronchoconstriction can be caused by many problems, including:   An infection such as the flu or a cold.  Allergies such as hay fever.  Diseases such as asthma or chronic obstructive pulmonary disease.  Smoking. Treatment for your wheezing depends on what is causing the problem. Your wheezing may get better without treatment. But you may need to pay attention to things that cause your wheezing and avoid them. Or you may need medicine to help treat the wheezing and to reduce the swelling or to relieve spasms in yourlungs. Follow-up care is a key part of your treatment and safety. Be sure to make and go to all appointments, and call your doctor if you are having problems. It is also a good idea to know your test results and keep alist of the medicines you take. How can you care for yourself at home?  Take your medicine exactly as prescribed. Call your doctor if you think you are having a problem with your medicine. You will get more details on the specific medicine your doctor prescribes.  If your doctor prescribed antibiotics, take them as directed. Do not stop taking them just because you feel better. You need to take the full course of antibiotics.  Breathe moist air from a humidifier, hot shower, or sink filled with hot water. This may help ease your symptoms and make it easier for you to breathe.  If you have congestion in your nose and throat, drinking plenty of fluids, especially hot fluids, may help relieve your symptoms.  If you have kidney, heart, or liver disease and have to limit fluids, talk with your doctor before you increase the amount of fluids you drink.  If you have mucus in your airways, it may help to breathe deeply and cough.  Do not smoke or allow others to smoke around you. Smoking can make your wheezing worse. If you need help quitting, talk to your doctor about stop-smoking programs and medicines. These can increase your chances of quitting for good.  Avoid things that may cause your wheezing. These may include colds, smoke, air pollution, dust, pollen, pets, cockroaches, stress, and cold air. When should you call for help? Call 911 anytime you think you may need emergency care. For example, call if:     You have severe trouble breathing.      You passed out (lost consciousness). Call your doctor now or seek immediate medical care if:     You cough up yellow, dark brown, or bloody mucus (sputum).      You have new or worse shortness of breath.      Your wheezing is not getting better or it gets worse after you start taking your medicine. Watch closely for changes in your health, and be sure to contact your doctor if:     You do not get better as expected. Where can you learn more? Go to https://Priv[employer] GroupepeCallidusCloud.SmartCare system. org and sign in to your [employer] account. Enter [id number] in the [facility] box to learn more about \"Wheezing or Bronchoconstriction: Care Instructions. \"     If you do not have an account, please click on the \"Sign Up Now\" link. Current as of: July 6, 2021               Content Version: 13.2  © 2006-2022 [employer]. Care instructions adapted under license by [facility] ([facility]). If you have questions about a medical condition or this instruction, always ask your healthcare professional. [place] [address] any warranty or liability for your use of this information.

## 2023-10-13 NOTE — TELEPHONE ENCOUNTER
[facility] Call Center    Phone Message    May a detailed message be left on voicemail: yes     Reason for Call: Other: Pt was informed by imaging that the mammo order on file is incorrect and that a new order for a diagnostic mammogram needs to be placed. Please place new order and call pt to discuss/ schedule. Thank you.     Action Taken: Other: [facility]  Pool    Travel Screening: Not Applicable

## 2023-10-13 NOTE — TELEPHONE ENCOUNTER
Patient called back. Went over MA and US for breast. Number provided for breast center scheduling.

## 2023-10-14 PROBLEM — Z78.9 NONIMMUNE TO HEPATITIS B VIRUS: Status: ACTIVE | Noted: 2023-10-14

## 2023-10-14 LAB — BACTERIA UR CULT: NORMAL

## 2023-10-16 LAB
ABO/RH(D): NORMAL
GAMMA INTERFERON BACKGROUND BLD IA-ACNC: 0 IU/ML
M TB IFN-G BLD-IMP: NEGATIVE
M TB IFN-G CD4+ BCKGRND COR BLD-ACNC: 4.6 IU/ML
MITOGEN IGNF BCKGRD COR BLD-ACNC: 0 IU/ML
MITOGEN IGNF BCKGRD COR BLD-ACNC: 0 IU/ML
QUANTIFERON MITOGEN: 4.6 IU/ML
QUANTIFERON NIL TUBE: 0 IU/ML
QUANTIFERON TB1 TUBE: 0 IU/ML
QUANTIFERON TB2 TUBE: 0
SPECIMEN EXPIRATION DATE: NORMAL

## 2023-10-18 ENCOUNTER — MYC MEDICAL ADVICE (OUTPATIENT)
Dept: OBGYN | Facility: CLINIC | Age: 33
End: 2023-10-18

## 2023-10-18 ENCOUNTER — ANCILLARY PROCEDURE (OUTPATIENT)
Dept: MAMMOGRAPHY | Facility: CLINIC | Age: 33
End: 2023-10-18
Attending: REGISTERED NURSE
Payer: COMMERCIAL

## 2023-10-18 DIAGNOSIS — N63.21 MASS OF UPPER OUTER QUADRANT OF LEFT BREAST: ICD-10-CM

## 2023-10-18 PROCEDURE — 76642 ULTRASOUND BREAST LIMITED: CPT | Mod: LT | Performed by: RADIOLOGY

## 2023-11-02 ENCOUNTER — NURSE TRIAGE (OUTPATIENT)
Dept: OBGYN | Facility: CLINIC | Age: 33
End: 2023-11-02

## 2023-11-02 ENCOUNTER — ANCILLARY PROCEDURE (OUTPATIENT)
Dept: ULTRASOUND IMAGING | Facility: CLINIC | Age: 33
End: 2023-11-02
Attending: ADVANCED PRACTICE MIDWIFE
Payer: COMMERCIAL

## 2023-11-02 ENCOUNTER — TELEPHONE (OUTPATIENT)
Dept: OBGYN | Facility: CLINIC | Age: 33
End: 2023-11-02

## 2023-11-02 ENCOUNTER — OFFICE VISIT (OUTPATIENT)
Dept: OBGYN | Facility: CLINIC | Age: 33
End: 2023-11-02
Attending: ADVANCED PRACTICE MIDWIFE
Payer: COMMERCIAL

## 2023-11-02 VITALS
HEART RATE: 91 BPM | DIASTOLIC BLOOD PRESSURE: 79 MMHG | SYSTOLIC BLOOD PRESSURE: 135 MMHG | BODY MASS INDEX: 23.55 KG/M2 | HEIGHT: 69 IN | WEIGHT: 159 LBS

## 2023-11-02 DIAGNOSIS — Z67.91 HISTORY OF RHD NEGATIVE BLOOD TYPING: ICD-10-CM

## 2023-11-02 DIAGNOSIS — O46.90 VAGINAL BLEEDING IN PREGNANCY: ICD-10-CM

## 2023-11-02 DIAGNOSIS — O02.1 MISSED AB: Primary | ICD-10-CM

## 2023-11-02 DIAGNOSIS — O46.90 VAGINAL BLEEDING IN PREGNANCY: Primary | ICD-10-CM

## 2023-11-02 PROCEDURE — 76817 TRANSVAGINAL US OBSTETRIC: CPT

## 2023-11-02 PROCEDURE — 99213 OFFICE O/P EST LOW 20 MIN: CPT | Performed by: ADVANCED PRACTICE MIDWIFE

## 2023-11-02 PROCEDURE — 76817 TRANSVAGINAL US OBSTETRIC: CPT | Mod: 26 | Performed by: OBSTETRICS & GYNECOLOGY

## 2023-11-02 PROCEDURE — 99215 OFFICE O/P EST HI 40 MIN: CPT | Mod: 25 | Performed by: ADVANCED PRACTICE MIDWIFE

## 2023-11-02 RX ORDER — DIAZEPAM 5 MG
10 TABLET ORAL ONCE
Qty: 2 TABLET | Refills: 0 | Status: SHIPPED | OUTPATIENT
Start: 2023-11-02 | End: 2023-11-02

## 2023-11-02 RX ORDER — METRONIDAZOLE 500 MG/1
500 TABLET ORAL ONCE
Qty: 1 TABLET | Refills: 0 | Status: SHIPPED | OUTPATIENT
Start: 2023-11-02 | End: 2023-11-02

## 2023-11-02 NOTE — PROGRESS NOTES
"S: River Dillon is a 32 year old  who presents for review of ultrasound following episode of spotting; ultrasound confirming missed .    Pt has report LMP of 23, yielding gestational age of 10+6 today. Had dating ultrasound on 10/13/23 with +FHTs, measuring 8+0, c/w LMP.    Presented for ultrasound today after dime sized amount of spotting this morning. No further bleeding, no cramping or pain.  Ultrasound performed today with CRL measuring 8+4 and no fetal cardiac activity. Missed ab.     Pt is interested in reviewing options- would like to complete interventions in a timely manner. Has good support at home of .  Hx of  x 1.    Of note, pt has documented hx of B negative and B positive blood typing.   See below:    18: B neg    8/10/20: B pos  Note from blood bank:  RhD genotyped as Weak D Type 1.  Pregnant women and transfusion recipients   expressing this variant have not been reported to make anti-D. Therefore, for   clinical purposes this patient is considered Rh positive and is not a   candidate for /postpartum Rh immune globulin prophylaxis.  This   patient can receive Rh Positive blood if red cell transfusions are necessary.   Note: This patient may be reported as Rh Negative by other institutions   depending on the anti-D reagent and methods used to resolve Rh D   discrepancies.     20: B pos  Blood bank note: Rh genotyping previously performed at [place], [place] on 2020. Patient   should be considered as Rh Positive.   21: B pos    10/13/23: B pos  Blood bank note: Patient is a weak or partial Rh (D) and genotyping could help distinguish between the two. For transfusion purposes, including RhIg administration, the patient will be considered Rh(D) negative. For donation purposes, the patient should be considered Rh(D) positive.      O: /79   Pulse 91   Ht 1.753 m (5' 9\")   Wt 72.1 kg (159 lb)   LMP 2023 (Exact Date)   " BMI 23.48 kg/m      Ultrasound:  Dating (mm/dd/yyyy):   LMP: 2023            EDC:  2024            GA by LMP:         10w6d     Current Scan On:  2023          EDC:  2024            GA by Current Scan:        8w4d  The calculation of the gestational age by current scan was based on CRL.  Anatomy Scan:  Walker gestation.  Biometry:  CRL                       2.0 cm                  8w4d                      Yolk Sac               : visualized                                          Bulging noted at fetal cord insert.                  Fetal heart activity:  No heart activity detected.  Findings:      Maternal Structures:  Cervix: The cervix appears long and closed.  Right Ovary: Corpus luteum  Left Ovary: Wnl  Technique:Transvaginal Imaging performed     Impression: nonviable IUP at 8+4 weeks    A: (O02.1) Missed ab  (primary encounter diagnosis)  Plan: diazepam (VALIUM) 5 MG tablet, metroNIDAZOLE       (Z67.91) History of RhD negative blood typing  Plan: Adult Oncology/Hematology  Referral    P:   - Confirmed missed .  - Reviewed ultrasound and findings with pt.  - Discussed management options, including expectant, medication, in OR or in office mva.  - Pt desires in clinic MVA as soon as possible.  - Reviewed with scheduling, RN team and MD.   Scheduled for 11/3/23.  - Consents signed. Pt interested in genetic testing. 500mg flagyl sent to be taken morning of procedure.  Prescription given for 10mg valium. At this time, pt states she is not interested in valium for procedure.    - Instructions given. Will present for procedure 11/3.    - Need to assess for Rhogam need at that time.  Hematology referral placed for more information.     - Bleeding precautions and s/s to present to ED reviewed.     Pt verbalized understanding and agrees with plan of care.     BRYON Kiran, CNM    40 minutes spent on the date of the encounter doing chart review, history and exam,  documentation and further activities as noted above

## 2023-11-02 NOTE — TELEPHONE ENCOUNTER
Received call from patient.  at 10w6d, c/o vaginal bleeding. Reports she went to the bathroom and noticed a dime size spot of dried blood, thinks it may have started at 0820 but couldn't really feel bleeding as it happened. When she went to the bathroom and noticed the dried blood on her underwear, she noticed some on the toilet paper as well. Denies cramping, recent intercourse or pelvic exam. Pt reports she was out of town for the last 4 days, returned yesterday and had a large BM this morning that she had to strain slightly for. Discussed that this may be cause of spotting. Discussed bleeding precautions. Scheduled for viability US per clinic protocol. Routed to [facility] on call for FYI.    Reason for Disposition   SPOTTING (single or brief episode)    Additional Information   Negative: Shock suspected (e.g., cold/pale/clammy skin, too weak to stand, low BP, rapid pulse)   Negative: Difficult to awaken or acting confused (e.g., disoriented, slurred speech)   Negative: Passed out (i.e., fainted, collapsed and was not responding)   Negative: Sounds like a life-threatening emergency to the triager   Negative: Vaginal bleeding and pregnant 20 or more weeks   Negative: Not pregnant or pregnancy status unknown   Negative: SEVERE abdominal pain (e.g., excruciating)   Negative: SEVERE vaginal bleeding (e.g., soaking 2 pads or tampons per hour and present 2 or more hours; 1 menstrual cup every 2 hours)   Negative: SEVERE dizziness (e.g., unable to stand, requires support to walk, feels like passing out)   Negative: MODERATE vaginal bleeding (i.e., soaking 1 pad) and present > 6 hours   Negative: MODERATE vaginal bleeding (i.e., soaking 1 pad / hour, clots) and pregnant > 12 weeks   Negative: Passed tissue (e.g., gray-white)   Negative: Shoulder pain   Negative: Constant abdominal pain lasting > 1 hour   Negative: Fever 100.4 F (38.0 C) or higher   Negative: Pale skin (pallor) of new-onset or worsening   Negative:  "Patient sounds very sick or weak to the triager   Negative: MODERATE vaginal bleeding (i.e., soaking 1 pad / hour; clots)   Negative: Intermittent lower abdominal pain (e.g., cramping) lasting > 24 hours   Negative: Pain or burning with passing urine (urination)   Negative: Prior history of 'ectopic pregnancy' or previous tubal surgery (e.g., tubal ligation)   Negative: Using heparin (e.g., Lovenox) or other strong blood thinner, or known bleeding disorder (e.g., thrombocytopenia)   Negative: Patient wants to be seen   Negative: MILD vaginal bleeding (i.e., less than 1 pad / hour; less than patient's usual menstrual bleeding; not just spotting)   Negative: SPOTTING lasting > 48 hours or spotting happens more than once in a week   Negative: Has IUD   Negative: Unusual vaginal discharge (e.g., bad smelling, yellow, green, or foamy-white)   Negative: Not feeling pregnant any longer (e.g., breast tenderness or nausea has disappeared)    Answer Assessment - Initial Assessment Questions  1. ONSET: \"When did this bleeding start?\"        Bleeding started this morning around 0820.     2. DESCRIPTION: \"Describe the bleeding that you are having.\" \"How much bleeding is there?\"     - SPOTTING: spotting, or pinkish / brownish mucous discharge; does not fill panty liner or pad     - MILD:  less than 1 pad / hour; less than patient's usual menstrual bleeding    - MODERATE: 1-2 pads / hour; 1 menstrual cup every 6 hours; small-medium blood clots (e.g., pea, grape, small coin)    - SEVERE: soaking 2 or more pads/hour for 2 or more hours; 1 menstrual cup every 2 hours; bleeding not contained by pads or continuous red blood from vagina; large blood clots (e.g., golf ball, large coin)       Dime size amount of blood on underwear when she urinated around 1015. When she wiped the toilet paper was discolored as well.     3. ABDOMINAL PAIN SEVERITY: If present, ask: \"How bad is it?\"  (e.g., Scale 1-10; mild, moderate, or severe)    - MILD " "(1-3): doesn't interfere with normal activities, abdomen soft and not tender to touch     - MODERATE (4-7): interferes with normal activities or awakens from sleep, abdomen tender to touch     - SEVERE (8-10): excruciating pain, doubled over, unable to do any normal activities      No    4. PREGNANCY: \"Do you know how many weeks or months pregnant you are?\" \"When was the first day of your last normal menstrual period?\"      10w6d    5. HEMODYNAMIC STATUS: \"Are you weak or feeling lightheaded?\" If Yes, ask: \"Can you stand and walk normally?\"       No    6. OTHER SYMPTOMS: \"What other symptoms are you having with the bleeding?\" (e.g., passed tissue, vaginal discharge, fever, menstrual-type cramps)      No    Protocols used: Pregnancy - Vaginal Bleeding Less Than 20 Weeks EGA-A-OH    "

## 2023-11-02 NOTE — CONFIDENTIAL NOTE
Caller reporting the following red-flag symptom(s): pregnant pt - has bleeding    Per the system red-flag symptom policy, patient was instructed to:  speak with a Registered Nurse    Action:  Patient warm transferred to a Registered Nurse
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
## 2023-11-03 ENCOUNTER — ALLIED HEALTH/NURSE VISIT (OUTPATIENT)
Dept: OBGYN | Facility: CLINIC | Age: 33
End: 2023-11-03
Attending: OBSTETRICS & GYNECOLOGY
Payer: COMMERCIAL

## 2023-11-03 VITALS — HEART RATE: 114 BPM | DIASTOLIC BLOOD PRESSURE: 83 MMHG | SYSTOLIC BLOOD PRESSURE: 131 MMHG

## 2023-11-03 DIAGNOSIS — O02.1 MISSED AB: Primary | ICD-10-CM

## 2023-11-03 PROBLEM — Z67.90 RH(D) POSITIVE: Status: ACTIVE | Noted: 2023-11-03

## 2023-11-03 PROBLEM — Z67.91 HISTORY OF RHD NEGATIVE BLOOD TYPING: Status: ACTIVE | Noted: 2023-11-03

## 2023-11-03 PROBLEM — Z34.81 ENCOUNTER FOR SUPERVISION OF OTHER NORMAL PREGNANCY, FIRST TRIMESTER: Status: RESOLVED | Noted: 2023-10-13 | Resolved: 2023-11-03

## 2023-11-03 LAB
BLOOD BANK CHART COMMENT: NORMAL
SPECIMEN EXPIRATION DATE: NORMAL

## 2023-11-03 PROCEDURE — 99207 PR NO BILLABLE SERVICE THIS VISIT: CPT

## 2023-11-03 PROCEDURE — 59820 CARE OF MISCARRIAGE: CPT | Performed by: OBSTETRICS & GYNECOLOGY

## 2023-11-03 PROCEDURE — 88305 TISSUE EXAM BY PATHOLOGIST: CPT | Mod: 26 | Performed by: PATHOLOGY

## 2023-11-03 PROCEDURE — 88305 TISSUE EXAM BY PATHOLOGIST: CPT | Mod: TC | Performed by: OBSTETRICS & GYNECOLOGY

## 2023-11-03 NOTE — CONFIDENTIAL NOTE
Called patient to request she come in at 1:00 pm per Dr. Berry. Reached . [facility] with clinic number to call back to confirm.  
3 = A little assistance

## 2023-11-03 NOTE — TELEPHONE ENCOUNTER
Called patient to request she come in at 1:00 pm per Dr. Berry. Reached . [facility] with clinic number to call back to confirm.

## 2023-11-03 NOTE — TELEPHONE ENCOUNTER
Received call from patient. Patient reports she will likely be here at noon due to  situation but is aware that she will likely have to wait until 1:00 for procedure.
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
## 2023-11-03 NOTE — PROGRESS NOTES
Patient arrived and ready for procedure.  Would like to talk to Dr about whether blood type may have impacted pregnancy loss.    Procedure completed without difficulty.  River tolerated well and discharged in stable condition with .

## 2023-11-17 LAB
PATH REPORT.COMMENTS IMP SPEC: NORMAL
PATH REPORT.COMMENTS IMP SPEC: NORMAL
PATH REPORT.FINAL DX SPEC: NORMAL
PATH REPORT.GROSS SPEC: NORMAL
PATH REPORT.MICROSCOPIC SPEC OTHER STN: NORMAL
PATH REPORT.RELEVANT HX SPEC: NORMAL
PHOTO IMAGE: NORMAL

## 2024-10-06 ENCOUNTER — HEALTH MAINTENANCE LETTER (OUTPATIENT)
Age: 34
End: 2024-10-06